# Patient Record
Sex: FEMALE | Race: WHITE | NOT HISPANIC OR LATINO | Employment: FULL TIME | URBAN - METROPOLITAN AREA
[De-identification: names, ages, dates, MRNs, and addresses within clinical notes are randomized per-mention and may not be internally consistent; named-entity substitution may affect disease eponyms.]

---

## 2017-05-26 ENCOUNTER — APPOINTMENT (OUTPATIENT)
Dept: RADIOLOGY | Facility: MEDICAL CENTER | Age: 47
End: 2017-05-26
Attending: EMERGENCY MEDICINE
Payer: MEDICAID

## 2017-05-26 ENCOUNTER — HOSPITAL ENCOUNTER (EMERGENCY)
Facility: MEDICAL CENTER | Age: 47
End: 2017-05-26
Attending: EMERGENCY MEDICINE
Payer: MEDICAID

## 2017-05-26 VITALS
OXYGEN SATURATION: 99 % | DIASTOLIC BLOOD PRESSURE: 79 MMHG | TEMPERATURE: 98.2 F | HEIGHT: 66 IN | RESPIRATION RATE: 16 BRPM | SYSTOLIC BLOOD PRESSURE: 137 MMHG | HEART RATE: 76 BPM

## 2017-05-26 DIAGNOSIS — S83.92XA SPRAIN OF LEFT KNEE, UNSPECIFIED LIGAMENT, INITIAL ENCOUNTER: ICD-10-CM

## 2017-05-26 DIAGNOSIS — M25.462 KNEE EFFUSION, LEFT: ICD-10-CM

## 2017-05-26 PROCEDURE — 99284 EMERGENCY DEPT VISIT MOD MDM: CPT

## 2017-05-26 PROCEDURE — 73564 X-RAY EXAM KNEE 4 OR MORE: CPT | Mod: LT

## 2017-05-26 PROCEDURE — 93971 EXTREMITY STUDY: CPT | Mod: 26 | Performed by: SURGERY

## 2017-05-26 PROCEDURE — 93971 EXTREMITY STUDY: CPT

## 2017-05-26 RX ORDER — TRAMADOL HYDROCHLORIDE 50 MG/1
50-100 TABLET ORAL EVERY 6 HOURS PRN
Qty: 20 TAB | Refills: 0 | Status: SHIPPED | OUTPATIENT
Start: 2017-05-26 | End: 2019-04-27 | Stop reason: CLARIF

## 2017-05-26 ASSESSMENT — PAIN SCALES - GENERAL: PAINLEVEL_OUTOF10: 8

## 2017-05-26 NOTE — ED NOTES
"Chief Complaint   Patient presents with   • Knee Injury     pt reports slipping 2 weeks ago on the stairs, yesterday she became unable to ambulte on L leg. CMS+     Blood pressure 159/94, pulse 78, temperature 36.8 °C (98.2 °F), resp. rate 16, height 1.676 m (5' 6\"), SpO2 99 %.  Pt informed of wait times. Educated on triage process.  Asked to return to triage RN for any new or worsening of symptoms. Thanked for patience.        "

## 2017-05-26 NOTE — ED AVS SNAPSHOT
[employer] Access Code: [email or web address]  Expires: 6/25/2017  2:10 PM    Your email address is not on file at [email or web address].  Email Addresses are required for you to sign up for [employer], please contact [phone] to verify your personal information and to provide your email address prior to attempting to register for [employer].    Leela Carmona  [address]  [place] [address]    [employer]  A secure, online tool to manage your health information     [email or web address]’s [employer]® is a secure, online tool that connects you to your personalized health information from the privacy of your home -- day or night - making it very easy for you to manage your healthcare. Once the activation process is completed, you can even access your medical information using the [employer] griselda, which is available for free in the Apple Griselda store or Google Play store.     To learn more about [employer], visit [email or web address]    There are two levels of access available (as shown below):   My Chart Features  [facility] Primary Care Doctor [facility]  Specialists [facility]  Urgent  Care Non-[facility] Primary Care Doctor   Email your healthcare team securely and privately 24/7 X X X    Manage appointments: schedule your next appointment; view details of past/upcoming appointments X      Request prescription refills. X      View recent personal medical records, including lab and immunizations X X X X   View health record, including health history, allergies, medications X X X X   Read reports about your outpatient visits, procedures, consult and ER notes X X X X   See your discharge summary, which is a recap of your hospital and/or ER visit that includes your diagnosis, lab results, and care plan X X  X     How to register for [email or web address]:  Once your e-mail address has been verified, follow the following steps to sign up for [email or web address].     1. Go to  [email or web address]  2. Click on the Sign Up Now box, which takes you to the New Member Sign Up  page. You will need to provide the following information:  a. Enter your [employer] Access Code exactly as it appears at the top of this page. (You will not need to use this code after you’ve completed the sign-up process. If you do not sign up before the expiration date, you must request a new code.)   b. Enter your date of birth.   c. Enter your home email address.   d. Click Submit, and follow the next screen’s instructions.  3. Create a [employer] Lily ID. This will be your [employer] login ID and cannot be changed, so think of one that is secure and easy to remember.  4. Create a [employer] password. You can change your password at any time.  5. Enter your Password Reset Question and Answer. This can be used at a later time if you forget your password.   6. Enter your e-mail address. This allows you to receive e-mail notifications when new information is available in [employer] Lily.  7. Click Sign Up. You can now view your health information.    For assistance activating your [employer] account, call [phone]

## 2017-05-26 NOTE — ED AVS SNAPSHOT
Home Care Instructions                                                                                                                Leela Carmona   MRN: [id number]    Department:  [facility], Emergency Dept   Date of Visit:  5/26/2017            [facility], Emergency Dept    [address]    [facility] [address]    Phone:  [phone]      You were seen by     Sandeep Zarate M.D.      Your Diagnosis Was     Sprain of left knee, unspecified ligament, initial encounter     S83.92XA       Follow-up Information     1. Schedule an appointment as soon as possible for a visit with Braxton Myrick M.D..    Specialty:  Orthopaedics    Contact information    [address]  [address]  [facility] [address]  [phone]        Medication Information     Review all of your home medications and newly ordered medications with your primary doctor and/or pharmacist as soon as possible. Follow medication instructions as directed by your doctor and/or pharmacist.     Please keep your complete medication list with you and share with your physician. Update the information when medications are discontinued, doses are changed, or new medications (including over-the-counter products) are added; and carry medication information at all times in the event of emergency situations.               Medication List      START taking these medications        Instructions    Morning Afternoon Evening Bedtime    tramadol 50 MG Tabs   Commonly known as:  ULTRAM        Take 1-2 Tabs by mouth every 6 hours as needed for Moderate Pain.   Dose:   mg                          ASK your doctor about these medications        Instructions    Morning Afternoon Evening Bedtime    citalopram 40 MG Tabs   Commonly known as:  CELEXA        Take 40 mg by mouth every day.   Dose:  40 mg                        SYNTHROID 175 MCG Tabs   Generic drug:  levothyroxine        Take 175 mcg by mouth Every morning on an  empty stomach.   Dose:  175 mcg                             Where to Get Your Medications      You can get these medications from any pharmacy     Bring a paper prescription for each of these medications    - tramadol 50 MG Tabs            Procedures and tests performed during your visit     APPLICATION KNEE IMMOBILIZER    CRUTCHES    DX-KNEE COMPLETE 4+ LEFT    LE VENOUS DUPLEX (Specify in Comments Left, Right Or Bilateral)        Discharge Instructions       Knee Effusion  Knee effusion means that you have extra fluid in your knee. This can cause pain. Your knee may be more difficult to bend and move.  HOME CARE  · Use crutches as told by your doctor.  · Wear a knee brace as told by your doctor.  · Apply ice to the swollen area:  · Put ice in a plastic bag.  · Place a towel between your skin and the bag.  · Leave the ice on for 20 minutes, 2-3 times per day.  · Keep your knee raised (elevated) when you are sitting or lying down.  · Take medicines only as told by your doctor.  · Do any rehabilitation or strengthening exercises as told by your doctor.  · Rest your knee as told by your doctor. You may start doing your normal activities again when your doctor says it is okay.  · Keep all follow-up visits as told by your doctor. This is important.  GET HELP IF:   · You continue to have pain in your knee.  GET HELP RIGHT AWAY IF:  · You have increased swelling or redness of your knee.  · You have severe pain in your knee.  · You have a fever.     This information is not intended to replace advice given to you by your health care provider. Make sure you discuss any questions you have with your health care provider.     Document Released: 01/20/2012 Document Revised: 01/08/2016 Document Reviewed: 08/03/2015  [employer] Interactive Patient Education ©2016 [employer].    Joint Sprain  A sprain is a tear or stretch in the ligaments that hold a joint together. Severe sprains may need as long as 3-6 weeks of immobilization  and/or exercises to heal completely. Sprained joints should be rested and protected. If not, they can become unstable and prone to re-injury. Proper treatment can reduce your pain, shorten the period of disability, and reduce the risk of repeated injuries.  TREATMENT   · Rest and elevate the injured joint to reduce pain and swelling.  · Apply ice packs to the injury for 20-30 minutes every 2-3 hours for the next 2-3 days.  · Keep the injury wrapped in a compression bandage or splint as long as the joint is painful or as instructed by your caregiver.  · Do not use the injured joint until it is completely healed to prevent re-injury and chronic instability. Follow the instructions of your caregiver.  · Long-term sprain management may require exercises and/or treatment by a physical therapist. Taping or special braces may help stabilize the joint until it is completely better.  SEEK MEDICAL CARE IF:   · You develop increased pain or swelling of the joint.  · You develop increasing redness and warmth of the joint.  · You develop a fever.  · It becomes stiff.  · Your hand or foot gets cold or numb.  Document Released: 01/25/2006 Document Revised: 03/11/2013 Document Reviewed: 01/04/2010  ExitCare® Patient Information ©2014 [employer].            Patient Information     Patient Information    Following emergency treatment: all patient requiring follow-up care must return either to a private physician or a clinic if your condition worsens before you are able to obtain further medical attention, please return to the emergency room.     Billing Information    At [facility], we work to make the billing process streamlined for our patients.  Our Representatives are here to answer any questions you may have regarding your hospital bill.  If you have insurance coverage and have supplied your insurance information to us, we will submit a claim to your insurer on your behalf.  Should you have any questions regarding your  bill, we can be reached online or by phone as follows:  Online: You are able pay your bills online or live chat with our representatives about any billing questions you may have. We are here to help Monday - Friday from 8:00am to 7:30pm and 9:00am - 12:00pm on Saturdays.  Please visit [email or web address]  for more information.   Phone:  [phone] or [phone]    Please note that your emergency physician, surgeon, pathologist, radiologist, anesthesiologist, and other specialists are not employed by [facility] and will therefore bill separately for their services.  Please contact them directly for any questions concerning their bills at the numbers below:     Emergency Physician Services:  [phone]  [facility]:  [phone]  [facility]:  [phone]  [facility]:  [phone]    1. Your final bill may vary from the amount quoted upon discharge if all procedures are not complete at that time, or if your doctor has additional procedures of which we are not aware. You will receive an additional bill if you return to the Emergency Department at [facility] for suture removal regardless of the facility of which the sutures were placed.     2. Please arrange for settlement of this account at the emergency registration.    3. All self-pay accounts are due in full at the time of treatment.  If you are unable to meet this obligation then payment is expected within 4-5 days.     4. If you have had radiology studies (CT, X-ray, Ultrasound, MRI), you have received a preliminary result during your emergency department visit. Please contact the radiology department [phone] to receive a copy of your final result. Please discuss the Final result with your primary physician or with the follow up physician provided.     Crisis Hotline:  [place] Crisis Hotline:  9-572-DUAINCD or 1-398.854.6873  [place] Crisis Hotline:     5-857-087-6309 or 666-212-5904         ED Discharge Follow Up Questions    1. In order to provide you with very good care, we would like to follow up with a phone call in the next few days.  May we have your permission to contact you?     YES /  NO    2. What is the best phone number to call you? (       )_____-__________    3. What is the best time to call you?      Morning  /  Afternoon  /  Evening                   Patient Signature:  ____________________________________________________________    Date:  ____________________________________________________________

## 2017-05-26 NOTE — ED PROVIDER NOTES
ED Provider Note    CHIEF COMPLAINT   Chief Complaint   Patient presents with   • Knee Injury     pt reports slipping 2 weeks ago on the stairs, yesterday she became unable to ambulte on L leg. CMS+       HPI   Leela Carmona is a 46 y.o. female who presents complaining of left knee pain radiating down to her left ankle.  She has noticed no swelling.  She denies history of DVT.  No chest pain, cough or shortness of breath.  Patient twisted her ankle 2 weeks ago however states for approximately 1 week things were well.  She then developed gradual pain left knee worsening over the last week.  She states today she cannot put any weight on her left leg secondary to pain in her knee.  Her male  states the patient has not been resting her leg adequately, in his opinion.  There was no direct blow to the knee.  The initial injury was a twist with pain.    REVIEW OF SYSTEMS   Musculoskeletal: Left knee pain, left leg pain  Neurologic: No headache  Skin: No swelling or bruising  Respiratory: No cough or pleurisy      PAST MEDICAL HISTORY   Past Medical History   Diagnosis Date   • Thyroid disorder    • Psychiatric disorder        FAMILY HISTORY  No family history on file.    SOCIAL HISTORY  Social History     Social History   • Marital Status: Single     Spouse Name: N/A   • Number of Children: N/A   • Years of Education: N/A     Social History Main Topics   • Smoking status: Current Every Day Smoker -- 0.50 packs/day for 33 years     Types: Cigarettes   • Smokeless tobacco: Never Used   • Alcohol Use: No   • Drug Use: Yes   • Sexual Activity:     Partners: Male     Other Topics Concern   • None     Social History Narrative       SURGICAL HISTORY  Past Surgical History   Procedure Laterality Date   • Primary c section  4/4/2012     Performed by JAROCHO DOYLE at LABOR AND DELIVERY       CURRENT MEDICATIONS   Home Medications     **Home medications have not yet been reviewed for this encounter**     "      ALLERGIES   No Known Allergies    PHYSICAL EXAM  VITAL SIGNS: /94 mmHg  Pulse 78  Temp(Src) 36.8 °C (98.2 °F)  Resp 16  Ht 1.676 m (5' 6\")  SpO2 99%  Skin: No swelling, no asymmetric edema..   Vascular: Intact distal capillary refill.  Normal left foot dorsal pedis pulse  Musculoskeletal: Diffuse left knee pain, mild left calf tenderness.  Negative Homans sign.  No deformity.  Left ankle and left hip are nontender  Neurologic: Sensation is intact in the right foot    RADIOLOGY/PROCEDURES  LE VENOUS DUPLEX (Specify in Comments Left, Right Or Bilateral)   Final Result      DX-KNEE COMPLETE 4+ LEFT   Final Result      Lateral subluxation of the patella with small joint effusion.               INTERPRETING LOCATION:  [address] [place] [address], [facility]            COURSE & MEDICAL DECISION MAKING  Pertinent Labs & Imaging studies reviewed. (See chart for details)  Patient with late onset knee pain week after twisting it, suspect sprain or possible meniscal injury.  There was an effusion on her x-ray and she will be referred to orthopedics.  Also secondary to late onset pain following the injury, patient's scan for DVT which was negative.  She is placed in knee immobilizer, crutches, discharged in stable condition.    FINAL IMPRESSION     1. Sprain of left knee, unspecified ligament, initial encounter    2. Knee effusion, left              Electronically signed by: Sandeep Zarate, 5/26/2017 12:25 PM    "

## 2017-05-26 NOTE — ED NOTES
Pt discharged to home. Pt was given follow up instructions and prescription for ultram. Pt verbalized understanding of all instructions for discharge and is ambulatory out of ED with steady gait.

## 2017-05-26 NOTE — DISCHARGE INSTRUCTIONS
Knee Effusion  Knee effusion means that you have extra fluid in your knee. This can cause pain. Your knee may be more difficult to bend and move.  HOME CARE  · Use crutches as told by your doctor.  · Wear a knee brace as told by your doctor.  · Apply ice to the swollen area:  · Put ice in a plastic bag.  · Place a towel between your skin and the bag.  · Leave the ice on for 20 minutes, 2-3 times per day.  · Keep your knee raised (elevated) when you are sitting or lying down.  · Take medicines only as told by your doctor.  · Do any rehabilitation or strengthening exercises as told by your doctor.  · Rest your knee as told by your doctor. You may start doing your normal activities again when your doctor says it is okay.  · Keep all follow-up visits as told by your doctor. This is important.  GET HELP IF:   · You continue to have pain in your knee.  GET HELP RIGHT AWAY IF:  · You have increased swelling or redness of your knee.  · You have severe pain in your knee.  · You have a fever.     This information is not intended to replace advice given to you by your health care provider. Make sure you discuss any questions you have with your health care provider.     Document Released: 01/20/2012 Document Revised: 01/08/2016 Document Reviewed: 08/03/2015  SeeMore Interactive Interactive Patient Education ©2016 SeeMore [employer] Inc.    Joint Sprain  A sprain is a tear or stretch in the ligaments that hold a joint together. Severe sprains may need as long as 3-6 weeks of immobilization and/or exercises to heal completely. Sprained joints should be rested and protected. If not, they can become unstable and prone to re-injury. Proper treatment can reduce your pain, shorten the period of disability, and reduce the risk of repeated injuries.  TREATMENT   · Rest and elevate the injured joint to reduce pain and swelling.  · Apply ice packs to the injury for 20-30 minutes every 2-3 hours for the next 2-3 days.  · Keep the injury wrapped in a  compression bandage or splint as long as the joint is painful or as instructed by your caregiver.  · Do not use the injured joint until it is completely healed to prevent re-injury and chronic instability. Follow the instructions of your caregiver.  · Long-term sprain management may require exercises and/or treatment by a physical therapist. Taping or special braces may help stabilize the joint until it is completely better.  SEEK MEDICAL CARE IF:   · You develop increased pain or swelling of the joint.  · You develop increasing redness and warmth of the joint.  · You develop a fever.  · It becomes stiff.  · Your hand or foot gets cold or numb.  Document Released: 01/25/2006 Document Revised: 03/11/2013 Document Reviewed: 01/04/2010  NanoSight® Patient Information ©2014 [employer], [employer]per[employer].

## 2017-05-26 NOTE — ED AVS SNAPSHOT
5/26/2017    Leela Carmona  [address]  [place] [address]    Dear Leela:    [facility] wants to ensure your discharge home is safe and you or your loved ones have had all of your questions answered regarding your care after you leave the hospital.    Below is a list of resources and contact information should you have any questions regarding your hospital stay, follow-up instructions, or active medical symptoms.    Questions or Concerns Regarding… Contact   Medical Questions Related to Your Discharge  (7 days a week, 8am-5pm) Contact a Nurse Care Coordinator   [phone]   Medical Questions Not Related to Your Discharge  (24 hours a day / 7 days a week)  Contact the Nurse Health Line   [phone]    Medications or Discharge Instructions Refer to your discharge packet   or contact your [facility] Primary Care Provider   [phone]   Follow-up Appointment(s) Schedule your appointment via [employer]   or contact Scheduling [phone]   Billing Review your statement via [employer]  or contact Billing [phone]   Medical Records Review your records via [employer]   or contact Medical Records [phone]     You may receive a telephone call within two days of discharge. This call is to make certain you understand your discharge instructions and have the opportunity to have any questions answered. You can also easily access your medical information, test results and upcoming appointments via the [employer] free online health management tool. You can learn more and sign up at [employer]. For assistance setting up your [employer] account, please call [phone].    Once again, we want to ensure your discharge home is safe and that you have a clear understanding of any next steps in your care. If you have any questions or concerns, please do not hesitate to contact us, we are here for you. Thank you for choosing [facility] for your healthcare needs.    Sincerely,    Your [facility] Healthcare Team

## 2017-07-14 ENCOUNTER — HOSPITAL ENCOUNTER (OUTPATIENT)
Dept: LAB | Facility: MEDICAL CENTER | Age: 47
End: 2017-07-14
Attending: FAMILY MEDICINE
Payer: COMMERCIAL

## 2017-07-14 LAB
ALBUMIN SERPL BCP-MCNC: 3.7 G/DL (ref 3.2–4.9)
ALBUMIN/GLOB SERPL: 1.4 G/DL
ALP SERPL-CCNC: 58 U/L (ref 30–99)
ALT SERPL-CCNC: 22 U/L (ref 2–50)
ANION GAP SERPL CALC-SCNC: 5 MMOL/L (ref 0–11.9)
APPEARANCE UR: CLEAR
AST SERPL-CCNC: 22 U/L (ref 12–45)
BACTERIA #/AREA URNS HPF: NEGATIVE /HPF
BASOPHILS # BLD AUTO: 1 % (ref 0–1.8)
BASOPHILS # BLD: 0.07 K/UL (ref 0–0.12)
BILIRUB SERPL-MCNC: 0.5 MG/DL (ref 0.1–1.5)
BILIRUB UR QL STRIP.AUTO: NEGATIVE
BUN SERPL-MCNC: 16 MG/DL (ref 8–22)
CALCIUM SERPL-MCNC: 9 MG/DL (ref 8.5–10.5)
CHLORIDE SERPL-SCNC: 108 MMOL/L (ref 96–112)
CHOLEST SERPL-MCNC: 179 MG/DL (ref 100–199)
CO2 SERPL-SCNC: 23 MMOL/L (ref 20–33)
COLOR UR: YELLOW
CREAT SERPL-MCNC: 0.78 MG/DL (ref 0.5–1.4)
EOSINOPHIL # BLD AUTO: 0.15 K/UL (ref 0–0.51)
EOSINOPHIL NFR BLD: 2 % (ref 0–6.9)
EPI CELLS #/AREA URNS HPF: ABNORMAL /HPF
ERYTHROCYTE [DISTWIDTH] IN BLOOD BY AUTOMATED COUNT: 43.8 FL (ref 35.9–50)
EST. AVERAGE GLUCOSE BLD GHB EST-MCNC: 94 MG/DL
GFR SERPL CREATININE-BSD FRML MDRD: >60 ML/MIN/1.73 M 2
GLOBULIN SER CALC-MCNC: 2.6 G/DL (ref 1.9–3.5)
GLUCOSE SERPL-MCNC: 68 MG/DL (ref 65–99)
GLUCOSE UR STRIP.AUTO-MCNC: NEGATIVE MG/DL
HBA1C MFR BLD: 4.9 % (ref 0–5.6)
HCT VFR BLD AUTO: 40.7 % (ref 37–47)
HDLC SERPL-MCNC: 48 MG/DL
HGB BLD-MCNC: 13.6 G/DL (ref 12–16)
HYALINE CASTS #/AREA URNS LPF: ABNORMAL /LPF
IMM GRANULOCYTES # BLD AUTO: 0.02 K/UL (ref 0–0.11)
IMM GRANULOCYTES NFR BLD AUTO: 0.3 % (ref 0–0.9)
KETONES UR STRIP.AUTO-MCNC: NEGATIVE MG/DL
LDLC SERPL CALC-MCNC: 110 MG/DL
LEUKOCYTE ESTERASE UR QL STRIP.AUTO: NEGATIVE
LYMPHOCYTES # BLD AUTO: 1.71 K/UL (ref 1–4.8)
LYMPHOCYTES NFR BLD: 23.2 % (ref 22–41)
MCH RBC QN AUTO: 30.2 PG (ref 27–33)
MCHC RBC AUTO-ENTMCNC: 33.4 G/DL (ref 33.6–35)
MCV RBC AUTO: 90.4 FL (ref 81.4–97.8)
MICRO URNS: ABNORMAL
MONOCYTES # BLD AUTO: 0.52 K/UL (ref 0–0.85)
MONOCYTES NFR BLD AUTO: 7.1 % (ref 0–13.4)
NEUTROPHILS # BLD AUTO: 4.89 K/UL (ref 2–7.15)
NEUTROPHILS NFR BLD: 66.4 % (ref 44–72)
NITRITE UR QL STRIP.AUTO: NEGATIVE
NRBC # BLD AUTO: 0 K/UL
NRBC BLD AUTO-RTO: 0 /100 WBC
PH UR STRIP.AUTO: 7 [PH]
PLATELET # BLD AUTO: 315 K/UL (ref 164–446)
PMV BLD AUTO: 9.3 FL (ref 9–12.9)
POTASSIUM SERPL-SCNC: 3.9 MMOL/L (ref 3.6–5.5)
PROT SERPL-MCNC: 6.3 G/DL (ref 6–8.2)
PROT UR QL STRIP: NEGATIVE MG/DL
RBC # BLD AUTO: 4.5 M/UL (ref 4.2–5.4)
RBC # URNS HPF: ABNORMAL /HPF
RBC UR QL AUTO: ABNORMAL
SODIUM SERPL-SCNC: 136 MMOL/L (ref 135–145)
SP GR UR STRIP.AUTO: 1.02
TRIGL SERPL-MCNC: 107 MG/DL (ref 0–149)
TSH SERPL DL<=0.005 MIU/L-ACNC: 11.24 UIU/ML (ref 0.3–3.7)
UROBILINOGEN UR STRIP.AUTO-MCNC: 0.2 MG/DL
WBC # BLD AUTO: 7.4 K/UL (ref 4.8–10.8)
WBC #/AREA URNS HPF: ABNORMAL /HPF

## 2017-07-14 PROCEDURE — 80061 LIPID PANEL: CPT

## 2017-07-14 PROCEDURE — 85025 COMPLETE CBC W/AUTO DIFF WBC: CPT

## 2017-07-14 PROCEDURE — 80053 COMPREHEN METABOLIC PANEL: CPT

## 2017-07-14 PROCEDURE — 36415 COLL VENOUS BLD VENIPUNCTURE: CPT

## 2017-07-14 PROCEDURE — 81001 URINALYSIS AUTO W/SCOPE: CPT

## 2017-07-14 PROCEDURE — 83036 HEMOGLOBIN GLYCOSYLATED A1C: CPT

## 2017-07-14 PROCEDURE — 84443 ASSAY THYROID STIM HORMONE: CPT

## 2017-08-03 ENCOUNTER — APPOINTMENT (OUTPATIENT)
Dept: RADIOLOGY | Facility: MEDICAL CENTER | Age: 47
End: 2017-08-03
Attending: EMERGENCY MEDICINE
Payer: COMMERCIAL

## 2017-08-03 ENCOUNTER — HOSPITAL ENCOUNTER (EMERGENCY)
Facility: MEDICAL CENTER | Age: 47
End: 2017-08-04
Attending: EMERGENCY MEDICINE
Payer: COMMERCIAL

## 2017-08-03 DIAGNOSIS — R31.9 HEMATURIA: ICD-10-CM

## 2017-08-03 DIAGNOSIS — M54.32 SCIATICA OF LEFT SIDE: ICD-10-CM

## 2017-08-03 LAB
APPEARANCE UR: CLEAR
BACTERIA #/AREA URNS HPF: NEGATIVE /HPF
BILIRUB UR QL STRIP.AUTO: NEGATIVE
COLOR UR: YELLOW
CULTURE IF INDICATED INDCX: NO UA CULTURE
EPI CELLS #/AREA URNS HPF: NEGATIVE /HPF
GLUCOSE UR STRIP.AUTO-MCNC: NEGATIVE MG/DL
HYALINE CASTS #/AREA URNS LPF: ABNORMAL /LPF
KETONES UR STRIP.AUTO-MCNC: ABNORMAL MG/DL
LEUKOCYTE ESTERASE UR QL STRIP.AUTO: NEGATIVE
MICRO URNS: ABNORMAL
NITRITE UR QL STRIP.AUTO: NEGATIVE
PH UR STRIP.AUTO: 5 [PH]
PROT UR QL STRIP: NEGATIVE MG/DL
RBC # URNS HPF: ABNORMAL /HPF
RBC UR QL AUTO: ABNORMAL
SP GR UR STRIP.AUTO: 1.04
UROBILINOGEN UR STRIP.AUTO-MCNC: 1 MG/DL
WBC #/AREA URNS HPF: ABNORMAL /HPF

## 2017-08-03 PROCEDURE — 700102 HCHG RX REV CODE 250 W/ 637 OVERRIDE(OP): Performed by: EMERGENCY MEDICINE

## 2017-08-03 PROCEDURE — 99284 EMERGENCY DEPT VISIT MOD MDM: CPT

## 2017-08-03 PROCEDURE — 72100 X-RAY EXAM L-S SPINE 2/3 VWS: CPT

## 2017-08-03 PROCEDURE — 81001 URINALYSIS AUTO W/SCOPE: CPT

## 2017-08-03 PROCEDURE — 700111 HCHG RX REV CODE 636 W/ 250 OVERRIDE (IP): Performed by: EMERGENCY MEDICINE

## 2017-08-03 PROCEDURE — A9270 NON-COVERED ITEM OR SERVICE: HCPCS | Performed by: EMERGENCY MEDICINE

## 2017-08-03 RX ORDER — ACETAMINOPHEN 325 MG/1
650 TABLET ORAL ONCE
Status: COMPLETED | OUTPATIENT
Start: 2017-08-03 | End: 2017-08-03

## 2017-08-03 RX ORDER — PREDNISONE 20 MG/1
60 TABLET ORAL DAILY
Status: DISCONTINUED | OUTPATIENT
Start: 2017-08-04 | End: 2017-08-03

## 2017-08-03 RX ORDER — PREDNISONE 20 MG/1
60 TABLET ORAL ONCE
Status: COMPLETED | OUTPATIENT
Start: 2017-08-03 | End: 2017-08-03

## 2017-08-03 RX ADMIN — PREDNISONE 60 MG: 20 TABLET ORAL at 21:25

## 2017-08-03 RX ADMIN — ACETAMINOPHEN 650 MG: 325 TABLET, FILM COATED ORAL at 21:25

## 2017-08-03 ASSESSMENT — PAIN SCALES - GENERAL: PAINLEVEL_OUTOF10: 7

## 2017-08-03 NOTE — ED AVS SNAPSHOT
Home Care Instructions                                                                                                                Leela Carmona   MRN: [id number]    Department:  [facility], Emergency Dept   Date of Visit:  8/3/2017            [facility], Emergency Dept    [address] [address]    Phone:  [phone]      You were seen by     Cal Solis M.D.      Your Diagnosis Was     Sciatica of left side     M54.32       These are the medications you received during your hospitalization from 08/03/2017 2008 to 08/04/2017 0026     Date/Time Order Dose Route Action    08/03/2017 2125 acetaminophen (TYLENOL) tablet 650 mg 650 mg Oral Given    08/03/2017 2125 predniSONE (DELTASONE) tablet 60 mg 60 mg Oral Given      Follow-up Information     1. Follow up with Sandeep Espinosa M.D..    Specialty:  Family Medicine    Contact information    [address]  [address]  [place] [address] [phone]          2. Follow up with Tu Saucedo M.D..    Specialty:  Orthopaedics    Contact information    [address]  [address]  [place] [address] [phone]        Medication Information     Review all of your home medications and newly ordered medications with your primary doctor and/or pharmacist as soon as possible. Follow medication instructions as directed by your doctor and/or pharmacist.     Please keep your complete medication list with you and share with your physician. Update the information when medications are discontinued, doses are changed, or new medications (including over-the-counter products) are added; and carry medication information at all times in the event of emergency situations.               Medication List      START taking these medications        Instructions    Morning Afternoon Evening Bedtime    predniSONE 20 MG Tabs   Last time this was given:  60 mg on 8/3/2017  9:25 PM   Commonly known as:  DELTASONE        Take 2  Tabs by mouth every day for 5 days.   Dose:  40 mg                          ASK your doctor about these medications        Instructions    Morning Afternoon Evening Bedtime    citalopram 40 MG Tabs   Commonly known as:  CELEXA        Take 40 mg by mouth every day.   Dose:  40 mg                        SYNTHROID 175 MCG Tabs   Generic drug:  levothyroxine        Take 175 mcg by mouth Every morning on an empty stomach.   Dose:  175 mcg                        tramadol 50 MG Tabs   Commonly known as:  ULTRAM        Take 1-2 Tabs by mouth every 6 hours as needed for Moderate Pain.   Dose:   mg                             Where to Get Your Medications      You can get these medications from any pharmacy     Bring a paper prescription for each of these medications    - predniSONE 20 MG Tabs            Procedures and tests performed during your visit     DX-LUMBAR SPINE-2 OR 3 VIEWS    URINALYSIS CULTURE, IF INDICATED    URINE MICROSCOPIC (W/UA)        Discharge Instructions       Sciatica  Sciatica is pain, weakness, numbness, or tingling along your sciatic nerve. The nerve starts in the lower back and runs down the back of each leg. Nerve damage or certain conditions pinch or put pressure on the sciatic nerve. This causes the pain, weakness, and other discomforts of sciatica.  HOME CARE   · Only take medicine as told by your doctor.  · Apply ice to the affected area for 20 minutes. Do this 3-4 times a day for the first 48-72 hours. Then try heat in the same way.  · Exercise, stretch, or do your usual activities if these do not make your pain worse.  · Go to physical therapy as told by your doctor.  · Keep all doctor visits as told.  · Do not wear high heels or shoes that are not supportive.  · Get a firm mattress if your mattress is too soft to lessen pain and discomfort.  GET HELP RIGHT AWAY IF:   · You cannot control when you poop (bowel movement) or pee (urinate).  · You have more weakness in your lower back,  lower belly (pelvis), butt (buttocks), or legs.  · You have redness or puffiness (swelling) of your back.  · You have a burning feeling when you pee.  · You have pain that gets worse when you lie down.  · You have pain that wakes you from your sleep.  · Your pain is worse than past pain.  · Your pain lasts longer than 4 weeks.  · You are suddenly losing weight without reason.  MAKE SURE YOU:   · Understand these instructions.  · Will watch this condition.  · Will get help right away if you are not doing well or get worse.     This information is not intended to replace advice given to you by your health care provider. Make sure you discuss any questions you have with your health care provider.     Document Released: 09/26/2009 Document Revised: 06/18/2013 Document Reviewed: 04/28/2013  [employer] Interactive Patient Education ©2016 [employer]    Radicular Pain  Radicular pain in either the arm or leg is usually from a bulging or herniated disk in the spine. A piece of the herniated disk may press against the nerves as the nerves exit the spine. This causes pain which is felt at the tips of the nerves down the arm or leg. Other causes of radicular pain may include:  · Fractures.  · Heart disease.  · Cancer.  · An abnormal and usually degenerative state of the nervous system or nerves (neuropathy).  Diagnosis may require CT or MRI scanning to determine the primary cause.   Nerves that start at the neck (nerve roots) may cause radicular pain in the outer shoulder and arm. It can spread down to the thumb and fingers. The symptoms vary depending on which nerve root has been affected. In most cases radicular pain improves with conservative treatment. Neck problems may require physical therapy, a neck collar, or cervical traction. Treatment may take many weeks, and surgery may be considered if the symptoms do not improve.   Conservative treatment is also recommended for sciatica. Sciatica causes pain to radiate from the  lower back or buttock area down the leg into the foot. Often there is a history of back problems. Most patients with sciatica are better after 2 to 4 weeks of rest and other supportive care. Short term bed rest can reduce the disk pressure considerably. Sitting, however, is not a good position since this increases the pressure on the disk. You should avoid bending, lifting, and all other activities which make the problem worse. Traction can be used in severe cases. Surgery is usually reserved for patients who do not improve within the first months of treatment.  Only take over-the-counter or prescription medicines for pain, discomfort, or fever as directed by your caregiver. Narcotics and muscle relaxants may help by relieving more severe pain and spasm and by providing mild sedation. Cold or massage can give significant relief. Spinal manipulation is not recommended. It can increase the degree of disc protrusion. Epidural steroid injections are often effective treatment for radicular pain. These injections deliver medicine to the spinal nerve in the space between the protective covering of the spinal cord and back bones (vertebrae). Your caregiver can give you more information about steroid injections. These injections are most effective when given within two weeks of the onset of pain.   You should see your caregiver for follow up care as recommended. A program for neck and back injury rehabilitation with stretching and strengthening exercises is an important part of management.   SEEK IMMEDIATE MEDICAL CARE IF:  · You develop increased pain, weakness, or numbness in your arm or leg.  · You develop difficulty with bladder or bowel control.  · You develop abdominal pain.     This information is not intended to replace advice given to you by your health care provider. Make sure you discuss any questions you have with your health care provider.     Document Released: 01/25/2006 Document Revised: 01/08/2016 Document  Reviewed: 04/12/2010  [employer] Interactive Patient Education ©2016 [employer]    Hematuria  Hematuria is the presence of blood in the urine. This condition can result from many problems. Some of these are:   · Urinary infections.   · Injuries.   · Kidney stones.   · Drug reactions.   · Tumors.   · Other diseases.   The treatment depends on the diagnosis. Further studies may be needed to find the cause even if the hematuria subsides on its own. An exam by an urologist may also be indicated.  If you are bleeding heavily, or have prostate problems, clots can form in the bladder and block the passage of urine. This requires emergency treatment with a catheter and bladder irrigation.   Contact your doctor for follow-up care within the next 2-4 days.  SEEK IMMEDIATE MEDICAL CARE IF:  You develop a fever, abdominal pain, urinary blockage, vomiting, or any other serious problems.  Document Released: 01/25/2006 Document Revised: 03/11/2013 Document Reviewed: 10/30/2009  ExitCare® Patient Information ©2013 E96.          Patient Information     Patient Information    Following emergency treatment: all patient requiring follow-up care must return either to a private physician or a clinic if your condition worsens before you are able to obtain further medical attention, please return to the emergency room.     Billing Information    At [facility], we work to make the billing process streamlined for our patients.  Our Representatives are here to answer any questions you may have regarding your hospital bill.  If you have insurance coverage and have supplied your insurance information to us, we will submit a claim to your insurer on your behalf.  Should you have any questions regarding your bill, we can be reached online or by phone as follows:  Online: You are able pay your bills online or live chat with our representatives about any billing questions you may have. We are here to help Monday - Friday from 8:00am  to 7:30pm and 9:00am - 12:00pm on Saturdays.  Please visit [email or web address]  for more information.   Phone:  [phone] or [phone]    Please note that your emergency physician, surgeon, pathologist, radiologist, anesthesiologist, and other specialists are not employed by [facility] and will therefore bill separately for their services.  Please contact them directly for any questions concerning their bills at the numbers below:     Emergency Physician Services:  [phone]  [facility]:  [phone]  [facility]:  [phone]  [facility]:  [phone]    1. Your final bill may vary from the amount quoted upon discharge if all procedures are not complete at that time, or if your doctor has additional procedures of which we are not aware. You will receive an additional bill if you return to the Emergency Department at [facility] for suture removal regardless of the facility of which the sutures were placed.     2. Please arrange for settlement of this account at the emergency registration.    3. All self-pay accounts are due in full at the time of treatment.  If you are unable to meet this obligation then payment is expected within 4-5 days.     4. If you have had radiology studies (CT, X-ray, Ultrasound, MRI), you have received a preliminary result during your emergency department visit. Please contact the radiology department [phone] to receive a copy of your final result. Please discuss the Final result with your primary physician or with the follow up physician provided.     Crisis Hotline:  [place] Crisis Hotline:  6-342-PZVSDQJ or 1-963.195.2678  [place] Crisis Hotline:    1-186.991.9839 or 136-887-3997         ED Discharge Follow Up Questions    1. In order to provide you with very good care, we would like to follow up with a phone call in the next few days.  May we have your permission to contact you?      YES /  NO    2. What is the best phone number to call you? (       )_____-__________    3. What is the best time to call you?      Morning  /  Afternoon  /  Evening                   Patient Signature:  ____________________________________________________________    Date:  ____________________________________________________________

## 2017-08-03 NOTE — ED AVS SNAPSHOT
Booyah Access Code: [email or web address]  Expires: 9/3/2017 12:26 AM    Your email address is not on file at [facility].  Email Addresses are required for you to sign up for Booyah, please contact [phone] to verify your personal information and to provide your email address prior to attempting to register for Booyah.    Leela Carmona  [address]  [place] [address]    Booyah  A secure, online tool to manage your health information     [facility]’s Booyah® is a secure, online tool that connects you to your personalized health information from the privacy of your home -- day or night - making it very easy for you to manage your healthcare. Once the activation process is completed, you can even access your medical information using the Booyah griselda, which is available for free in the Apple Griselda store or Google Play store.     To learn more about Booyah, visit [email or web address]    There are two levels of access available (as shown below):   My Chart Features  [facility] Primary Care Doctor [facility]  Specialists [facility]  Urgent  Care Non-[facility] Primary Care Doctor   Email your healthcare team securely and privately 24/7 X X X    Manage appointments: schedule your next appointment; view details of past/upcoming appointments X      Request prescription refills. X      View recent personal medical records, including lab and immunizations X X X X   View health record, including health history, allergies, medications X X X X   Read reports about your outpatient visits, procedures, consult and ER notes X X X X   See your discharge summary, which is a recap of your hospital and/or ER visit that includes your diagnosis, lab results, and care plan X X  X     How to register for Booyah:  Once your e-mail address has been verified, follow the following steps to sign up for Booyah.     1. Go to  [email or web address]  2. Click on the Sign Up Now box, which takes you to the New Member Sign Up page.  You will need to provide the following information:  a. Enter your Ripar[employer] Access Code exactly as it appears at the top of this page. (You will not need to use this code after you’ve completed the sign-up process. If you do not sign up before the expiration date, you must request a new code.)   b. Enter your date of birth.   c. Enter your home email address.   d. Click Submit, and follow the next screen’s instructions.  3. Create a haystaggt ID. This will be your RiparAutOnline login ID and cannot be changed, so think of one that is secure and easy to remember.  4. Create a RiparAutOnline password. You can change your password at any time.  5. Enter your Password Reset Question and Answer. This can be used at a later time if you forget your password.   6. Enter your e-mail address. This allows you to receive e-mail notifications when new information is available in RiparAutOnline.  7. Click Sign Up. You can now view your health information.    For assistance activating your RiparAutOnline account, call [phone]

## 2017-08-03 NOTE — ED AVS SNAPSHOT
8/4/2017    Leela Carmona  [address]  [facility] [address]    Dear Leela:    [facility] wants to ensure your discharge home is safe and you or your loved ones have had all of your questions answered regarding your care after you leave the hospital.    Below is a list of resources and contact information should you have any questions regarding your hospital stay, follow-up instructions, or active medical symptoms.    Questions or Concerns Regarding… Contact   Medical Questions Related to Your Discharge  (7 days a week, 8am-5pm) Contact a Nurse Care Coordinator   [phone]   Medical Questions Not Related to Your Discharge  (24 hours a day / 7 days a week)  Contact the Nurse Health Line   [phone]    Medications or Discharge Instructions Refer to your discharge packet   or contact your [facility] Primary Care Provider   [phone]   Follow-up Appointment(s) Schedule your appointment via [employer]   or contact Scheduling [phone]   Billing Review your statement via [employer]  or contact Billing [phone]   Medical Records Review your records via [employer]   or contact Medical Records [phone]     You may receive a telephone call within two days of discharge. This call is to make certain you understand your discharge instructions and have the opportunity to have any questions answered. You can also easily access your medical information, test results and upcoming appointments via the [employer] free online health management tool. You can learn more and sign up at [email or web address]. For assistance setting up your [employer] account, please call [phone].    Once again, we want to ensure your discharge home is safe and that you have a clear understanding of any next steps in your care. If you have any questions or concerns, please do not hesitate to contact us, we are here for you. Thank you for choosing [facility] for your healthcare needs.    Sincerely,    Your [facility] Healthcare Team

## 2017-08-04 VITALS
BODY MASS INDEX: 37.31 KG/M2 | SYSTOLIC BLOOD PRESSURE: 145 MMHG | DIASTOLIC BLOOD PRESSURE: 74 MMHG | HEIGHT: 66 IN | WEIGHT: 232.14 LBS | RESPIRATION RATE: 19 BRPM | TEMPERATURE: 98.8 F | OXYGEN SATURATION: 98 % | HEART RATE: 76 BPM

## 2017-08-04 RX ORDER — PREDNISONE 20 MG/1
40 TABLET ORAL DAILY
Qty: 10 TAB | Refills: 0 | Status: SHIPPED | OUTPATIENT
Start: 2017-08-04 | End: 2017-08-09

## 2017-08-04 NOTE — DISCHARGE INSTRUCTIONS
Sciatica  Sciatica is pain, weakness, numbness, or tingling along your sciatic nerve. The nerve starts in the lower back and runs down the back of each leg. Nerve damage or certain conditions pinch or put pressure on the sciatic nerve. This causes the pain, weakness, and other discomforts of sciatica.  HOME CARE   · Only take medicine as told by your doctor.  · Apply ice to the affected area for 20 minutes. Do this 3-4 times a day for the first 48-72 hours. Then try heat in the same way.  · Exercise, stretch, or do your usual activities if these do not make your pain worse.  · Go to physical therapy as told by your doctor.  · Keep all doctor visits as told.  · Do not wear high heels or shoes that are not supportive.  · Get a firm mattress if your mattress is too soft to lessen pain and discomfort.  GET HELP RIGHT AWAY IF:   · You cannot control when you poop (bowel movement) or pee (urinate).  · You have more weakness in your lower back, lower belly (pelvis), butt (buttocks), or legs.  · You have redness or puffiness (swelling) of your back.  · You have a burning feeling when you pee.  · You have pain that gets worse when you lie down.  · You have pain that wakes you from your sleep.  · Your pain is worse than past pain.  · Your pain lasts longer than 4 weeks.  · You are suddenly losing weight without reason.  MAKE SURE YOU:   · Understand these instructions.  · Will watch this condition.  · Will get help right away if you are not doing well or get worse.     This information is not intended to replace advice given to you by your health care provider. Make sure you discuss any questions you have with your health care provider.     Document Released: 09/26/2009 Document Revised: 06/18/2013 Document Reviewed: 04/28/2013  [employer] Interactive Patient Education ©2016 [employer] Inc.    Radicular Pain  Radicular pain in either the arm or leg is usually from a bulging or herniated disk in the spine. A piece of the herniated  disk may press against the nerves as the nerves exit the spine. This causes pain which is felt at the tips of the nerves down the arm or leg. Other causes of radicular pain may include:  · Fractures.  · Heart disease.  · Cancer.  · An abnormal and usually degenerative state of the nervous system or nerves (neuropathy).  Diagnosis may require CT or MRI scanning to determine the primary cause.   Nerves that start at the neck (nerve roots) may cause radicular pain in the outer shoulder and arm. It can spread down to the thumb and fingers. The symptoms vary depending on which nerve root has been affected. In most cases radicular pain improves with conservative treatment. Neck problems may require physical therapy, a neck collar, or cervical traction. Treatment may take many weeks, and surgery may be considered if the symptoms do not improve.   Conservative treatment is also recommended for sciatica. Sciatica causes pain to radiate from the lower back or buttock area down the leg into the foot. Often there is a history of back problems. Most patients with sciatica are better after 2 to 4 weeks of rest and other supportive care. Short term bed rest can reduce the disk pressure considerably. Sitting, however, is not a good position since this increases the pressure on the disk. You should avoid bending, lifting, and all other activities which make the problem worse. Traction can be used in severe cases. Surgery is usually reserved for patients who do not improve within the first months of treatment.  Only take over-the-counter or prescription medicines for pain, discomfort, or fever as directed by your caregiver. Narcotics and muscle relaxants may help by relieving more severe pain and spasm and by providing mild sedation. Cold or massage can give significant relief. Spinal manipulation is not recommended. It can increase the degree of disc protrusion. Epidural steroid injections are often effective treatment for radicular  pain. These injections deliver medicine to the spinal nerve in the space between the protective covering of the spinal cord and back bones (vertebrae). Your caregiver can give you more information about steroid injections. These injections are most effective when given within two weeks of the onset of pain.   You should see your caregiver for follow up care as recommended. A program for neck and back injury rehabilitation with stretching and strengthening exercises is an important part of management.   SEEK IMMEDIATE MEDICAL CARE IF:  · You develop increased pain, weakness, or numbness in your arm or leg.  · You develop difficulty with bladder or bowel control.  · You develop abdominal pain.     This information is not intended to replace advice given to you by your health care provider. Make sure you discuss any questions you have with your health care provider.     Document Released: 01/25/2006 Document Revised: 01/08/2016 Document Reviewed: 04/12/2010  [employer] Interactive Patient Education ©2016 [employer]    Hematuria  Hematuria is the presence of blood in the urine. This condition can result from many problems. Some of these are:   · Urinary infections.   · Injuries.   · Kidney stones.   · Drug reactions.   · Tumors.   · Other diseases.   The treatment depends on the diagnosis. Further studies may be needed to find the cause even if the hematuria subsides on its own. An exam by an urologist may also be indicated.  If you are bleeding heavily, or have prostate problems, clots can form in the bladder and block the passage of urine. This requires emergency treatment with a catheter and bladder irrigation.   Contact your doctor for follow-up care within the next 2-4 days.  SEEK IMMEDIATE MEDICAL CARE IF:  You develop a fever, abdominal pain, urinary blockage, vomiting, or any other serious problems.  Document Released: 01/25/2006 Document Revised: 03/11/2013 Document Reviewed: 10/30/2009  ExitCare® Patient  Information ©2013 [facility].

## 2017-08-04 NOTE — ED NOTES
Pt discharged home . Discharge instructions reviewed with patient, all questions answered.   Pt ambulatory, vital signs stable.   Pt states they have a safe way home.

## 2017-08-04 NOTE — ED PROVIDER NOTES
"ED Provider Note    Scribed for Cal Solis M.D. by Ursula Coello. 8/3/2017, 8:43 PM.    Primary care provider: Sandeep Espinosa M.D.  Means of arrival: Walk-in  History obtained from: Patient  History limited by: None    CHIEF COMPLAINT  Chief Complaint   Patient presents with   • Low Back Pain     L side x2 weeks       HPI  Leela Carmona is a 46 y.o. female who presents to the Emergency Department for left sided lower back pain onset two weeks ago. The patient states that the pain radiates down into her legs and can be intermittent. This pain initially began when the patient hurt her left knee by moving 3 months ago and was seen by an orthopedist. Now the pain has been up her back and hip on the left side, exacerbated at the end of the day after walking and working all day. She has been taking four 800mg Ibuprofen every six hours for two weeks now. The patient denies IV drug use, urinary or bowel incontinence, or unexpected weight gain or loss.     REVIEW OF SYSTEMS  See HPI for further details. All other systems are negative.   E    PAST MEDICAL HISTORY   has a past medical history of Thyroid disorder and Psychiatric disorder.    SURGICAL HISTORY   has past surgical history that includes primary c section (4/4/2012).    SOCIAL HISTORY  Social History   Substance Use Topics   • Smoking status: Current Every Day Smoker -- 0.50 packs/day for 33 years     Types: Cigarettes   • Smokeless tobacco: Never Used   • Alcohol Use: No      History   Drug Use   • Yes       FAMILY HISTORY  No pertinent family history     CURRENT MEDICATIONS  Reviewed.  See Encounter Summary.     ALLERGIES  No Known Allergies    PHYSICAL EXAM  VITAL SIGNS: /88 mmHg  Pulse 76  Temp(Src) 36.8 °C (98.2 °F)  Resp 18  Ht 1.676 m (5' 6\")  Wt 105.3 kg (232 lb 2.3 oz)  BMI 37.49 kg/m2  SpO2 100%  Constitutional: Alert in no apparent distress. Tearful.  HENT: No signs of trauma, Bilateral external ears normal, Nose normal.   Eyes: " Pupils are equal and reactive, Conjunctiva normal, Non-icteric.   Neck: Normal range of motion, No tenderness, Supple, No stridor.   Lymphatic: No lymphadenopathy noted.   Cardiovascular: Regular rate and rhythm, no murmurs.   Thorax & Lungs: Normal breath sounds, No respiratory distress, No wheezing, No chest tenderness.   Abdomen: Bowel sounds normal, Soft, No tenderness, No masses, No pulsatile masses. No peritoneal signs.  Skin: Warm, Dry, No erythema, No rash.   Back: No bony tenderness, No CVA tenderness.   Extremities: Intact distal pulses, No edema, No cyanosis, Left SI join tenderness, Negative straight leg raise  Musculoskeletal: Good range of motion in all major joints. No tenderness to palpation or major deformities noted.   Neurologic: Alert , Normal motor function, Normal sensory function, No focal deficits noted.   Psychiatric: Affect normal, Judgment normal, Mood normal.     DIAGNOSTIC STUDIES / PROCEDURES     LABS  Results for orders placed or performed during the hospital encounter of 08/03/17   URINALYSIS CULTURE, IF INDICATED   Result Value Ref Range    Color Yellow     Character Clear     Specific Gravity 1.036 <1.035    Ph 5.0 5.0-8.0    Glucose Negative Negative mg/dL    Ketones Trace (A) Negative mg/dL    Protein Negative Negative mg/dL    Bilirubin Negative Negative    Urobilinogen, Urine 1.0 Negative    Nitrite Negative Negative    Leukocyte Esterase Negative Negative    Occult Blood Trace (A) Negative    Micro Urine Req Microscopic     Culture Indicated No UA Culture   URINE MICROSCOPIC (W/UA)   Result Value Ref Range    WBC 0-2 /hpf    RBC 5-10 (A) /hpf    Bacteria Negative None /hpf    Epithelial Cells Negative /hpf    Hyaline Cast 0-2 /lpf     All labs were reviewed by me.    RADIOLOGY  DX-LUMBAR SPINE-2 OR 3 VIEWS   Final Result      Interspace arthropathy as noted above. No acute findings.        COURSE & MEDICAL DECISION MAKING  Pertinent Labs & Imaging studies reviewed. (See chart  for details)      8:46 PM - Patient seen and examined at bedside. I informed the patient of the likelihood of sciatica related pain. We also discussed the possibility of bladder or urinary infection. Patient will be treated with Tylenol 650mg PO, Prednisone 60mg PO. Ordered Urinalysis to evaluate her symptoms.     12:04 AM Recheck: Patient re-evaluated at beside. Patient reports feeling improved. Discussed patient's condition and treatment plan including potential further workup of patient's hematuria or discharge and follow up. I informed the patient of the possibility of proceeding with a CT but she stated that she would like to decline it at this time. Patient's lab and radiology results discussed. The patient understands and verbalizes agreement.     Decision Making:  This is a 46 y.o. year old female who presents with left buttock pain with radiation down leg. Strong suspicion for sciatica. X-rays unremarkable. Urinalysis does show evidence of hematuria but no evidence of UTI also decreasing ability for pyelonephritis. I do not believe that her hematuria increases my suspicion for kidney stone pathology given her presentation today. I will give her prescription for ongoing steroids and have her continue Tylenol and NSAIDs at home. I have referred her back to orthopedics for reevaluation and ongoing care. She is additionally says she can't talk her primary care physician about her symptoms as well.    The patient was informed of their blood pressure exceeding 120/80 and I have asked them to follow up with their primary care physician to discuss further monitoring and possible treatment.     The patient will return for new or worsening symptoms and is stable at the time of discharge.    The patient is referred to a primary physician for blood pressure management, diabetic screening, and for all other preventative health concerns.    DISPOSITION:  Patient will be discharged home in stable condition.    FOLLOW  UP:  Sandeep Espinosa M.D.  [address]  [address]  [place] [address]  [phone]          Tu Saucedo M.D.  [address]  [address]  [place] [address]  [phone]          OUTPATIENT MEDICATIONS:  New Prescriptions    PREDNISONE (DELTASONE) 20 MG TAB    Take 2 Tabs by mouth every day for 5 days.     FINAL IMPRESSION  1. Sciatica of left side    2. Hematuria          Ursula CASTILLO (Ezio), am scribing for, and in the presence of, Cal Solis M.D..    Electronically signed by: Ursula Coello (Ezio), 8/3/2017    Cal CASTILLO M.D. personally performed the services described in this documentation, as scribed by Ursula Coello in my presence, and it is both accurate and complete.    The note accurately reflects work and decisions made by me.  Cal Solis  8/4/2017  4:31 AM

## 2017-08-04 NOTE — ED NOTES
"Leela Corinna Carmona    Chief Complaint   Patient presents with   • Low Back Pain     L side x2 weeks       Pt ambulatory to triage with above complaint.  Pt states L lower back pain has increased today but has had these symptoms for 2 weeks.  Pain 7/10 described as sharp at times. Pt verbalized she saw PCP for low back pain and MD stated she had blood in her urine.  Pt was not given antibiotics after this result.  Pt denies obvious blood or pain with urination.  Pt given urine specimen cup.  VSS.  Pt returned to lobby, educated on triage process, and to inform staff of any changes or concerns.    Blood Pressure: (!) 178/88 mmHg, Pulse: 76, Respiration: 18, Temperature: 36.8 °C (98.2 °F), Height: 167.6 cm (5' 6\"), Weight: 105.3 kg (232 lb 2.3 oz), Pulse Oximetry: 100 %      "

## 2018-01-22 ENCOUNTER — HOSPITAL ENCOUNTER (OUTPATIENT)
Dept: RADIOLOGY | Facility: MEDICAL CENTER | Age: 48
End: 2018-01-22
Attending: FAMILY MEDICINE
Payer: COMMERCIAL

## 2018-01-22 DIAGNOSIS — R05.9 COUGH: ICD-10-CM

## 2018-01-22 PROCEDURE — 71046 X-RAY EXAM CHEST 2 VIEWS: CPT

## 2018-08-22 ENCOUNTER — HOSPITAL ENCOUNTER (OUTPATIENT)
Dept: LAB | Facility: MEDICAL CENTER | Age: 48
End: 2018-08-22
Attending: FAMILY MEDICINE
Payer: COMMERCIAL

## 2018-08-22 LAB
ALBUMIN SERPL BCP-MCNC: 3.6 G/DL (ref 3.2–4.9)
ALBUMIN/GLOB SERPL: 1.3 G/DL
ALP SERPL-CCNC: 74 U/L (ref 30–99)
ALT SERPL-CCNC: 19 U/L (ref 2–50)
ANION GAP SERPL CALC-SCNC: 7 MMOL/L (ref 0–11.9)
APPEARANCE UR: CLEAR
AST SERPL-CCNC: 20 U/L (ref 12–45)
BASOPHILS # BLD AUTO: 1 % (ref 0–1.8)
BASOPHILS # BLD: 0.08 K/UL (ref 0–0.12)
BILIRUB SERPL-MCNC: 0.5 MG/DL (ref 0.1–1.5)
BILIRUB UR QL STRIP.AUTO: NEGATIVE
BUN SERPL-MCNC: 12 MG/DL (ref 8–22)
CALCIUM SERPL-MCNC: 9 MG/DL (ref 8.5–10.5)
CHLORIDE SERPL-SCNC: 107 MMOL/L (ref 96–112)
CHOLEST SERPL-MCNC: 184 MG/DL (ref 100–199)
CO2 SERPL-SCNC: 23 MMOL/L (ref 20–33)
COLOR UR: YELLOW
CREAT SERPL-MCNC: 0.67 MG/DL (ref 0.5–1.4)
EOSINOPHIL # BLD AUTO: 0.18 K/UL (ref 0–0.51)
EOSINOPHIL NFR BLD: 2.3 % (ref 0–6.9)
ERYTHROCYTE [DISTWIDTH] IN BLOOD BY AUTOMATED COUNT: 42.6 FL (ref 35.9–50)
GLOBULIN SER CALC-MCNC: 2.7 G/DL (ref 1.9–3.5)
GLUCOSE SERPL-MCNC: 71 MG/DL (ref 65–99)
GLUCOSE UR STRIP.AUTO-MCNC: NEGATIVE MG/DL
HCT VFR BLD AUTO: 43.8 % (ref 37–47)
HDLC SERPL-MCNC: 43 MG/DL
HGB BLD-MCNC: 14.8 G/DL (ref 12–16)
IMM GRANULOCYTES # BLD AUTO: 0.02 K/UL (ref 0–0.11)
IMM GRANULOCYTES NFR BLD AUTO: 0.3 % (ref 0–0.9)
KETONES UR STRIP.AUTO-MCNC: NEGATIVE MG/DL
LDLC SERPL CALC-MCNC: 109 MG/DL
LEUKOCYTE ESTERASE UR QL STRIP.AUTO: NEGATIVE
LYMPHOCYTES # BLD AUTO: 1.62 K/UL (ref 1–4.8)
LYMPHOCYTES NFR BLD: 20.9 % (ref 22–41)
MCH RBC QN AUTO: 29.8 PG (ref 27–33)
MCHC RBC AUTO-ENTMCNC: 33.8 G/DL (ref 33.6–35)
MCV RBC AUTO: 88.1 FL (ref 81.4–97.8)
MICRO URNS: NORMAL
MONOCYTES # BLD AUTO: 0.59 K/UL (ref 0–0.85)
MONOCYTES NFR BLD AUTO: 7.6 % (ref 0–13.4)
NEUTROPHILS # BLD AUTO: 5.27 K/UL (ref 2–7.15)
NEUTROPHILS NFR BLD: 67.9 % (ref 44–72)
NITRITE UR QL STRIP.AUTO: NEGATIVE
NRBC # BLD AUTO: 0 K/UL
NRBC BLD-RTO: 0 /100 WBC
PH UR STRIP.AUTO: 7 [PH]
PLATELET # BLD AUTO: 303 K/UL (ref 164–446)
PMV BLD AUTO: 9.1 FL (ref 9–12.9)
POTASSIUM SERPL-SCNC: 4 MMOL/L (ref 3.6–5.5)
PROT SERPL-MCNC: 6.3 G/DL (ref 6–8.2)
PROT UR QL STRIP: NEGATIVE MG/DL
RBC # BLD AUTO: 4.97 M/UL (ref 4.2–5.4)
RBC UR QL AUTO: NEGATIVE
SODIUM SERPL-SCNC: 137 MMOL/L (ref 135–145)
SP GR UR STRIP.AUTO: 1.02
TRIGL SERPL-MCNC: 159 MG/DL (ref 0–149)
TSH SERPL DL<=0.005 MIU/L-ACNC: 0.27 UIU/ML (ref 0.38–5.33)
UROBILINOGEN UR STRIP.AUTO-MCNC: 0.2 MG/DL
WBC # BLD AUTO: 7.8 K/UL (ref 4.8–10.8)

## 2018-08-22 PROCEDURE — 80053 COMPREHEN METABOLIC PANEL: CPT

## 2018-08-22 PROCEDURE — 85025 COMPLETE CBC W/AUTO DIFF WBC: CPT

## 2018-08-22 PROCEDURE — 36415 COLL VENOUS BLD VENIPUNCTURE: CPT

## 2018-08-22 PROCEDURE — 80061 LIPID PANEL: CPT

## 2018-08-22 PROCEDURE — 81003 URINALYSIS AUTO W/O SCOPE: CPT

## 2018-08-22 PROCEDURE — 84443 ASSAY THYROID STIM HORMONE: CPT

## 2018-08-27 ENCOUNTER — NON-PROVIDER VISIT (OUTPATIENT)
Dept: URGENT CARE | Facility: PHYSICIAN GROUP | Age: 48
End: 2018-08-27

## 2018-08-27 DIAGNOSIS — Z02.1 PRE-EMPLOYMENT DRUG SCREENING: ICD-10-CM

## 2018-08-27 LAB
AMP AMPHETAMINE: NORMAL
BAR BARBITURATES: NORMAL
BZO BENZODIAZEPINES: NORMAL
COC COCAINE: NORMAL
INT CON NEG: NEGATIVE
INT CON POS: POSITIVE
MDMA ECSTASY: NORMAL
MET METHAMPHETAMINES: NORMAL
MTD METHADONE: NORMAL
OPI OPIATES: NORMAL
OXY OXYCODONE: NORMAL
PCP PHENCYCLIDINE: NORMAL
POC URINE DRUG SCREEN OCDRS: NORMAL
THC: NORMAL

## 2018-08-27 PROCEDURE — 80305 DRUG TEST PRSMV DIR OPT OBS: CPT | Performed by: PHYSICIAN ASSISTANT

## 2018-09-05 ENCOUNTER — NON-PROVIDER VISIT (OUTPATIENT)
Dept: OCCUPATIONAL MEDICINE | Facility: CLINIC | Age: 48
End: 2018-09-05

## 2018-09-05 PROCEDURE — 8911 PR MRO FEE: Performed by: INTERNAL MEDICINE

## 2019-04-27 ENCOUNTER — HOSPITAL ENCOUNTER (EMERGENCY)
Facility: MEDICAL CENTER | Age: 49
End: 2019-04-27
Attending: EMERGENCY MEDICINE
Payer: MEDICAID

## 2019-04-27 ENCOUNTER — APPOINTMENT (OUTPATIENT)
Dept: RADIOLOGY | Facility: MEDICAL CENTER | Age: 49
End: 2019-04-27
Attending: EMERGENCY MEDICINE
Payer: MEDICAID

## 2019-04-27 ENCOUNTER — HOSPITAL ENCOUNTER (INPATIENT)
Facility: MEDICAL CENTER | Age: 49
LOS: 1 days | DRG: 282 | End: 2019-04-29
Attending: EMERGENCY MEDICINE | Admitting: HOSPITALIST
Payer: MEDICAID

## 2019-04-27 VITALS
WEIGHT: 229.28 LBS | OXYGEN SATURATION: 95 % | BODY MASS INDEX: 36.85 KG/M2 | SYSTOLIC BLOOD PRESSURE: 120 MMHG | HEIGHT: 66 IN | RESPIRATION RATE: 19 BRPM | HEART RATE: 58 BPM | TEMPERATURE: 97.8 F | DIASTOLIC BLOOD PRESSURE: 75 MMHG

## 2019-04-27 DIAGNOSIS — R94.31 ABNORMAL EKG: ICD-10-CM

## 2019-04-27 DIAGNOSIS — R79.89 ELEVATED TROPONIN: ICD-10-CM

## 2019-04-27 DIAGNOSIS — I21.4 NSTEMI (NON-ST ELEVATED MYOCARDIAL INFARCTION) (HCC): ICD-10-CM

## 2019-04-27 DIAGNOSIS — I24.9 ACS (ACUTE CORONARY SYNDROME) (HCC): ICD-10-CM

## 2019-04-27 PROBLEM — I10 ESSENTIAL HYPERTENSION: Status: ACTIVE | Noted: 2019-04-27

## 2019-04-27 PROBLEM — E03.9 HYPOTHYROIDISM: Status: ACTIVE | Noted: 2019-04-27

## 2019-04-27 PROBLEM — Z72.0 TOBACCO USE: Status: ACTIVE | Noted: 2019-04-27

## 2019-04-27 LAB
ALBUMIN SERPL BCP-MCNC: 2.8 G/DL (ref 3.2–4.9)
ALBUMIN/GLOB SERPL: 1.3 G/DL
ALP SERPL-CCNC: 58 U/L (ref 30–99)
ALT SERPL-CCNC: 13 U/L (ref 2–50)
ANION GAP SERPL CALC-SCNC: 7 MMOL/L (ref 0–11.9)
APTT PPP: 193.1 SEC (ref 24.7–36)
APTT PPP: 47.8 SEC (ref 24.7–36)
AST SERPL-CCNC: 15 U/L (ref 12–45)
BASOPHILS # BLD AUTO: 0.8 % (ref 0–1.8)
BASOPHILS # BLD: 0.08 K/UL (ref 0–0.12)
BILIRUB SERPL-MCNC: 0.5 MG/DL (ref 0.1–1.5)
BUN SERPL-MCNC: 15 MG/DL (ref 8–22)
CALCIUM SERPL-MCNC: 8 MG/DL (ref 8.4–10.2)
CHLORIDE SERPL-SCNC: 106 MMOL/L (ref 96–112)
CO2 SERPL-SCNC: 22 MMOL/L (ref 20–33)
CREAT SERPL-MCNC: 0.87 MG/DL (ref 0.5–1.4)
EKG IMPRESSION: NORMAL
EKG IMPRESSION: NORMAL
EOSINOPHIL # BLD AUTO: 0.08 K/UL (ref 0–0.51)
EOSINOPHIL NFR BLD: 0.8 % (ref 0–6.9)
ERYTHROCYTE [DISTWIDTH] IN BLOOD BY AUTOMATED COUNT: 40 FL (ref 35.9–50)
GLOBULIN SER CALC-MCNC: 2.2 G/DL (ref 1.9–3.5)
GLUCOSE SERPL-MCNC: 89 MG/DL (ref 65–99)
HCT VFR BLD AUTO: 43.9 % (ref 37–47)
HGB BLD-MCNC: 15 G/DL (ref 12–16)
IMM GRANULOCYTES # BLD AUTO: 0.04 K/UL (ref 0–0.11)
IMM GRANULOCYTES NFR BLD AUTO: 0.4 % (ref 0–0.9)
INR PPP: 1.17 (ref 0.87–1.13)
LYMPHOCYTES # BLD AUTO: 1.79 K/UL (ref 1–4.8)
LYMPHOCYTES NFR BLD: 17.1 % (ref 22–41)
MCH RBC QN AUTO: 29.3 PG (ref 27–33)
MCHC RBC AUTO-ENTMCNC: 34.2 G/DL (ref 33.6–35)
MCV RBC AUTO: 85.7 FL (ref 81.4–97.8)
MONOCYTES # BLD AUTO: 0.7 K/UL (ref 0–0.85)
MONOCYTES NFR BLD AUTO: 6.7 % (ref 0–13.4)
NEUTROPHILS # BLD AUTO: 7.75 K/UL (ref 2–7.15)
NEUTROPHILS NFR BLD: 74.2 % (ref 44–72)
NRBC # BLD AUTO: 0 K/UL
NRBC BLD-RTO: 0 /100 WBC
PLATELET # BLD AUTO: 276 K/UL (ref 164–446)
PMV BLD AUTO: 8.5 FL (ref 9–12.9)
POTASSIUM SERPL-SCNC: 3.6 MMOL/L (ref 3.6–5.5)
PROT SERPL-MCNC: 5 G/DL (ref 6–8.2)
PROTHROMBIN TIME: 15 SEC (ref 12–14.6)
RBC # BLD AUTO: 5.12 M/UL (ref 4.2–5.4)
SODIUM SERPL-SCNC: 135 MMOL/L (ref 135–145)
TROPONIN I SERPL-MCNC: 0.16 NG/ML (ref 0–0.04)
TROPONIN I SERPL-MCNC: 2.28 NG/ML (ref 0–0.04)
TROPONIN I SERPL-MCNC: 3.54 NG/ML (ref 0–0.04)
WBC # BLD AUTO: 10.4 K/UL (ref 4.8–10.8)

## 2019-04-27 PROCEDURE — 99245 OFF/OP CONSLTJ NEW/EST HI 55: CPT | Performed by: INTERNAL MEDICINE

## 2019-04-27 PROCEDURE — 700111 HCHG RX REV CODE 636 W/ 250 OVERRIDE (IP)

## 2019-04-27 PROCEDURE — 99220 PR INITIAL OBSERVATION CARE,LEVL III: CPT | Performed by: INTERNAL MEDICINE

## 2019-04-27 PROCEDURE — 700111 HCHG RX REV CODE 636 W/ 250 OVERRIDE (IP): Performed by: INTERNAL MEDICINE

## 2019-04-27 PROCEDURE — 84484 ASSAY OF TROPONIN QUANT: CPT

## 2019-04-27 PROCEDURE — 96365 THER/PROPH/DIAG IV INF INIT: CPT

## 2019-04-27 PROCEDURE — 700102 HCHG RX REV CODE 250 W/ 637 OVERRIDE(OP): Performed by: EMERGENCY MEDICINE

## 2019-04-27 PROCEDURE — 85025 COMPLETE CBC W/AUTO DIFF WBC: CPT

## 2019-04-27 PROCEDURE — 71045 X-RAY EXAM CHEST 1 VIEW: CPT

## 2019-04-27 PROCEDURE — 85610 PROTHROMBIN TIME: CPT

## 2019-04-27 PROCEDURE — 99285 EMERGENCY DEPT VISIT HI MDM: CPT

## 2019-04-27 PROCEDURE — 93005 ELECTROCARDIOGRAM TRACING: CPT | Performed by: INTERNAL MEDICINE

## 2019-04-27 PROCEDURE — 700102 HCHG RX REV CODE 250 W/ 637 OVERRIDE(OP): Performed by: INTERNAL MEDICINE

## 2019-04-27 PROCEDURE — 96366 THER/PROPH/DIAG IV INF ADDON: CPT

## 2019-04-27 PROCEDURE — 36415 COLL VENOUS BLD VENIPUNCTURE: CPT

## 2019-04-27 PROCEDURE — A9270 NON-COVERED ITEM OR SERVICE: HCPCS | Performed by: EMERGENCY MEDICINE

## 2019-04-27 PROCEDURE — 84484 ASSAY OF TROPONIN QUANT: CPT | Mod: 91

## 2019-04-27 PROCEDURE — G0378 HOSPITAL OBSERVATION PER HR: HCPCS

## 2019-04-27 PROCEDURE — A9270 NON-COVERED ITEM OR SERVICE: HCPCS | Performed by: INTERNAL MEDICINE

## 2019-04-27 PROCEDURE — 93005 ELECTROCARDIOGRAM TRACING: CPT | Performed by: EMERGENCY MEDICINE

## 2019-04-27 PROCEDURE — 85730 THROMBOPLASTIN TIME PARTIAL: CPT | Mod: 91

## 2019-04-27 PROCEDURE — 93005 ELECTROCARDIOGRAM TRACING: CPT

## 2019-04-27 PROCEDURE — 80053 COMPREHEN METABOLIC PANEL: CPT

## 2019-04-27 PROCEDURE — 96374 THER/PROPH/DIAG INJ IV PUSH: CPT

## 2019-04-27 RX ORDER — NITROGLYCERIN 0.4 MG/1
0.4 TABLET SUBLINGUAL
Status: DISCONTINUED | OUTPATIENT
Start: 2019-04-27 | End: 2019-04-29 | Stop reason: HOSPADM

## 2019-04-27 RX ORDER — HEPARIN SODIUM 1000 [USP'U]/ML
6000 INJECTION, SOLUTION INTRAVENOUS; SUBCUTANEOUS ONCE
Status: COMPLETED | OUTPATIENT
Start: 2019-04-27 | End: 2019-04-27

## 2019-04-27 RX ORDER — ONDANSETRON 4 MG/1
4 TABLET, ORALLY DISINTEGRATING ORAL EVERY 4 HOURS PRN
Status: DISCONTINUED | OUTPATIENT
Start: 2019-04-27 | End: 2019-04-29 | Stop reason: HOSPADM

## 2019-04-27 RX ORDER — LISINOPRIL AND HYDROCHLOROTHIAZIDE 20; 12.5 MG/1; MG/1
1 TABLET ORAL DAILY
Status: ON HOLD | COMMUNITY
End: 2019-04-29

## 2019-04-27 RX ORDER — ASPIRIN 81 MG/1
324 TABLET, CHEWABLE ORAL ONCE
Status: COMPLETED | OUTPATIENT
Start: 2019-04-27 | End: 2019-04-27

## 2019-04-27 RX ORDER — PROMETHAZINE HYDROCHLORIDE 25 MG/1
12.5-25 TABLET ORAL EVERY 4 HOURS PRN
Status: DISCONTINUED | OUTPATIENT
Start: 2019-04-27 | End: 2019-04-29 | Stop reason: HOSPADM

## 2019-04-27 RX ORDER — HEPARIN SODIUM 1000 [USP'U]/ML
3200 INJECTION, SOLUTION INTRAVENOUS; SUBCUTANEOUS PRN
Status: DISCONTINUED | OUTPATIENT
Start: 2019-04-27 | End: 2019-04-29

## 2019-04-27 RX ORDER — CITALOPRAM 20 MG/1
40 TABLET ORAL EVERY EVENING
Status: DISCONTINUED | OUTPATIENT
Start: 2019-04-27 | End: 2019-04-29 | Stop reason: HOSPADM

## 2019-04-27 RX ORDER — GUAIFENESIN/DEXTROMETHORPHAN 100-10MG/5
10 SYRUP ORAL EVERY 6 HOURS PRN
Status: DISCONTINUED | OUTPATIENT
Start: 2019-04-27 | End: 2019-04-29 | Stop reason: HOSPADM

## 2019-04-27 RX ORDER — PROMETHAZINE HYDROCHLORIDE 25 MG/1
12.5-25 SUPPOSITORY RECTAL EVERY 4 HOURS PRN
Status: DISCONTINUED | OUTPATIENT
Start: 2019-04-27 | End: 2019-04-29 | Stop reason: HOSPADM

## 2019-04-27 RX ORDER — POLYETHYLENE GLYCOL 3350 17 G/17G
1 POWDER, FOR SOLUTION ORAL
Status: DISCONTINUED | OUTPATIENT
Start: 2019-04-27 | End: 2019-04-29 | Stop reason: HOSPADM

## 2019-04-27 RX ORDER — ACETAMINOPHEN 325 MG/1
650 TABLET ORAL EVERY 6 HOURS PRN
Status: DISCONTINUED | OUTPATIENT
Start: 2019-04-27 | End: 2019-04-29 | Stop reason: HOSPADM

## 2019-04-27 RX ORDER — MORPHINE SULFATE 4 MG/ML
2-4 INJECTION, SOLUTION INTRAMUSCULAR; INTRAVENOUS
Status: DISCONTINUED | OUTPATIENT
Start: 2019-04-27 | End: 2019-04-29 | Stop reason: HOSPADM

## 2019-04-27 RX ORDER — SODIUM CHLORIDE, SODIUM LACTATE, POTASSIUM CHLORIDE, CALCIUM CHLORIDE 600; 310; 30; 20 MG/100ML; MG/100ML; MG/100ML; MG/100ML
INJECTION, SOLUTION INTRAVENOUS CONTINUOUS
Status: DISCONTINUED | OUTPATIENT
Start: 2019-04-27 | End: 2019-04-27

## 2019-04-27 RX ORDER — BISACODYL 10 MG
10 SUPPOSITORY, RECTAL RECTAL
Status: DISCONTINUED | OUTPATIENT
Start: 2019-04-27 | End: 2019-04-29 | Stop reason: HOSPADM

## 2019-04-27 RX ORDER — SODIUM CHLORIDE 9 MG/ML
INJECTION, SOLUTION INTRAVENOUS CONTINUOUS
Status: DISCONTINUED | OUTPATIENT
Start: 2019-04-27 | End: 2019-04-27

## 2019-04-27 RX ORDER — METOPROLOL TARTRATE 1 MG/ML
5 INJECTION, SOLUTION INTRAVENOUS EVERY 4 HOURS PRN
Status: DISCONTINUED | OUTPATIENT
Start: 2019-04-27 | End: 2019-04-29 | Stop reason: HOSPADM

## 2019-04-27 RX ORDER — ATORVASTATIN CALCIUM 80 MG/1
80 TABLET, FILM COATED ORAL EVERY EVENING
Status: DISCONTINUED | OUTPATIENT
Start: 2019-04-27 | End: 2019-04-29 | Stop reason: HOSPADM

## 2019-04-27 RX ORDER — HEPARIN SODIUM 1000 [USP'U]/ML
INJECTION, SOLUTION INTRAVENOUS; SUBCUTANEOUS
Status: COMPLETED
Start: 2019-04-27 | End: 2019-04-27

## 2019-04-27 RX ORDER — ASPIRIN 325 MG
325 TABLET ORAL ONCE
Status: DISCONTINUED | OUTPATIENT
Start: 2019-04-27 | End: 2019-04-27

## 2019-04-27 RX ORDER — ONDANSETRON 2 MG/ML
4 INJECTION INTRAMUSCULAR; INTRAVENOUS EVERY 4 HOURS PRN
Status: DISCONTINUED | OUTPATIENT
Start: 2019-04-27 | End: 2019-04-29 | Stop reason: HOSPADM

## 2019-04-27 RX ORDER — AMOXICILLIN 250 MG
2 CAPSULE ORAL 2 TIMES DAILY
Status: DISCONTINUED | OUTPATIENT
Start: 2019-04-27 | End: 2019-04-29 | Stop reason: HOSPADM

## 2019-04-27 RX ORDER — NICOTINE 21 MG/24HR
14 PATCH, TRANSDERMAL 24 HOURS TRANSDERMAL
Status: DISCONTINUED | OUTPATIENT
Start: 2019-04-27 | End: 2019-04-29 | Stop reason: HOSPADM

## 2019-04-27 RX ADMIN — HEPARIN SODIUM 1200 UNITS/HR: 5000 INJECTION, SOLUTION INTRAVENOUS at 16:35

## 2019-04-27 RX ADMIN — LEVOTHYROXINE SODIUM 175 MCG: 125 TABLET ORAL at 16:41

## 2019-04-27 RX ADMIN — ASPIRIN 81 MG 324 MG: 81 TABLET ORAL at 13:27

## 2019-04-27 RX ADMIN — NICOTINE 14 MG: 14 PATCH, EXTENDED RELEASE TRANSDERMAL at 16:32

## 2019-04-27 RX ADMIN — HEPARIN SODIUM 6000 UNITS: 1000 INJECTION, SOLUTION INTRAVENOUS; SUBCUTANEOUS at 13:32

## 2019-04-27 RX ADMIN — ATORVASTATIN CALCIUM 80 MG: 80 TABLET, FILM COATED ORAL at 16:32

## 2019-04-27 RX ADMIN — CITALOPRAM HYDROBROMIDE 40 MG: 20 TABLET ORAL at 16:32

## 2019-04-27 ASSESSMENT — COGNITIVE AND FUNCTIONAL STATUS - GENERAL
SUGGESTED CMS G CODE MODIFIER DAILY ACTIVITY: CH
MOBILITY SCORE: 24
DAILY ACTIVITIY SCORE: 24
SUGGESTED CMS G CODE MODIFIER MOBILITY: CH

## 2019-04-27 ASSESSMENT — PATIENT HEALTH QUESTIONNAIRE - PHQ9
1. LITTLE INTEREST OR PLEASURE IN DOING THINGS: NOT AT ALL
2. FEELING DOWN, DEPRESSED, IRRITABLE, OR HOPELESS: NOT AT ALL
SUM OF ALL RESPONSES TO PHQ9 QUESTIONS 1 AND 2: 0

## 2019-04-27 ASSESSMENT — ENCOUNTER SYMPTOMS
DIZZINESS: 0
HEADACHES: 0
PALPITATIONS: 0
NAUSEA: 0
BLURRED VISION: 0
VOMITING: 0
ABDOMINAL PAIN: 0
BACK PAIN: 0
FEVER: 0
DIAPHORESIS: 0
SHORTNESS OF BREATH: 0
COUGH: 0

## 2019-04-27 ASSESSMENT — PAIN DESCRIPTION - DESCRIPTORS
DESCRIPTORS: ACHING
DESCRIPTORS: ACHING

## 2019-04-27 ASSESSMENT — LIFESTYLE VARIABLES: EVER_SMOKED: YES

## 2019-04-27 NOTE — ED NOTES
Bedside report given to RNKimberly. Kimberly was updated on critical aPTT lab value. Pt is currently asymptomatic. Pt transported via gurney on cardiac monitor.

## 2019-04-27 NOTE — ED NOTES
Charge mirella bautista aware of pt departure from [facility] as well as report given to same

## 2019-04-27 NOTE — ED TRIAGE NOTES
Chief Complaint   Patient presents with   • Chest Pain     aching chest pain onset of 1115 while at work, + troponin at [facility]     Pt BIB EMS as a transfer from [facility] for above complaints. The pt was treated at [facility] with 324mg of ASA and 6,000 units of Heparin. On arrival at [facility] the pt was able to ambulate to the bathroom without distress, skin PWD, and no chest pain. The pt is currently chest pain free, EKG performed, and is on the monitor. The ERP is at the bedside.

## 2019-04-27 NOTE — ED PROVIDER NOTES
ED Provider Note    Scribed for Gil Joseph M.D. by Aracelis Mcnamara. 4/27/2019, 2:18 PM.    Primary care provider: Sandeep Espinosa M.D.  Means of arrival: Ambulance  History obtained from: Patient  History limited by: None     CHIEF COMPLAINT  Chief Complaint   Patient presents with   • Chest Pain     aching chest pain onset of 1115 while at work, + troponin at [facility]  Leela Carmona is a 48 y.o. female with a history of thyroid disorder who presents to the Emergency Department as a transfer from [facility]. She initially presented to the ER with aching left-sided chest pain onset 11:15 AM while at work. Patient reports the episode lasted around 15 minutes and states the pain has resolved. She reports she experienced associated sweats and shortness of breath. She denies pain exacerbation with exertion. Denies nausea. She is a smoker and smokes 1 pack of cigarettes per day. Patient has an extensive family history of coronary disease and MI in multiple family members. She has not undergone a stress test. Patient was transferred to this ED for NSTEMI with elevated Troponin of 0.16. She was given aspirin and IV heparin at the transferring facility.     REVIEW OF SYSTEMS  Pertinent positives include chest pain, diaphoresis, shortness of breath.   Pertinent negatives include no nausea.   As above, all other systems reviewed and are negative.   See HPI for further details.     PAST MEDICAL HISTORY   has a past medical history of Psychiatric disorder and Thyroid disorder.    SURGICAL HISTORY   has a past surgical history that includes primary c section (4/4/2012).    SOCIAL HISTORY  Social History   Substance Use Topics   • Smoking status: Current Every Day Smoker     Packs/day: 0.50     Years: 33.00     Types: Cigarettes   • Smokeless tobacco: Never Used   • Alcohol use No      History   Drug Use     FAMILY HISTORY  None noted    CURRENT MEDICATIONS  No current facility-administered  "medications on file prior to encounter.      Current Outpatient Prescriptions on File Prior to Encounter   Medication Sig Dispense Refill   • lisinopril-hydrochlorothiazide (PRINZIDE, ZESTORETIC) 20-12.5 MG per tablet Take 1 Tab by mouth every day.     • levothyroxine (SYNTHROID) 175 MCG Tab Take 175 mcg by mouth every evening.     • citalopram (CELEXA) 40 MG TABS Take 40 mg by mouth every day.       ALLERGIES  No Known Allergies    PHYSICAL EXAM  VITAL SIGNS: Pulse 62   Ht 1.676 m (5' 6\")   Wt 103.9 kg (229 lb)   LMP 04/26/2019 (Exact Date)   SpO2 100%   BMI 36.96 kg/m²   Vitals reviewed.  Constitutional: Alert in no apparent distress.  HENT: No signs of trauma, Bilateral external ears normal, Nose normal.   Eyes: Pupils are equal and reactive, Conjunctiva normal, Non-icteric.   Neck: Normal range of motion, No tenderness, Supple, No stridor.   Lymphatic: No lymphadenopathy noted.   Cardiovascular: Regular rate and rhythm, no murmurs.   Thorax & Lungs: Normal breath sounds, No respiratory distress, No wheezing, No chest tenderness.   Abdomen: Bowel sounds normal, Soft, No tenderness, No peritoneal signs, No masses, No pulsatile masses.   Skin: Warm, Dry, No erythema, No rash.   Back: No bony tenderness, No CVA tenderness.   Extremities: Intact distal pulses, No edema, No tenderness, No cyanosis  Musculoskeletal: Good range of motion in all major joints. No tenderness to palpation or major deformities noted.   Neurologic: Alert , Normal motor function, Normal sensory function, No focal deficits noted.   Psychiatric: Affect normal, Judgment normal, Mood normal.     DIAGNOSTIC STUDIES / PROCEDURES    LABS  Labs Reviewed   PROTHROMBIN TIME   APTT      All labs reviewed by me.    EKG Interpretation:  Interpreted by me    12 Lead EKG interpreted by me to show:  Normal sinus rhythm  Rate 61  Axis: Normal  Intervals: Normal  Normal ST segments  Poor R wave progression anteriorly   Compared to EKG from earlier today at " 11:42 AM, no significant changes.   My impression of this EKG: Does not meet STEMI criteria at this time.    RADIOLOGY  No orders to display     The radiologist's interpretation of all radiological studies have been reviewed by me.    COURSE & MEDICAL DECISION MAKING  Nursing notes, VS, PMSFHx reviewed in chart.  Differential diagnoses include but not limited to: NSTEMI      Obtained and reviewed medical records from transferring facility showed the following:    Troponin 0.16  CBC Normal   CMP Normal     She was given aspirin and bolus of IV heparin at [facility].     2:18 PM Patient seen and examined at bedside. Ordered for EKG to evaluate. EKG shows poor R wave progression indicating possible anterior infarct of unknown age. No ST elevation, the patient is pain free currently. She was informed of plan of care including admission for further cardiac work up and cardiology consult. Discussed with patient that cardiology consult is necessary in order to determine whether patient may need a catheterization. Patient understands and is agreeable.     I have ordered PT/PTT for patient given heparin bolus.     2:25 PM Paged Dr. Negrete, Cardiology.     2:33 PM Consulted Dr. Negrete, Cardiology, who requests for coagulation work up and agrees with admission. He will consult patient. Ordered PT/INR and PTT.     2:35 PM Consulted Dr. Herrera, Hospitalist, who agreed to admit patient.     DISPOSITION:  Patient will be admitted to Dr. Herrera, Hospitalist, in guarded condition.    FINAL IMPRESSION  1. NSTEMI (non-ST elevated myocardial infarction) (HCC)    2. Elevated troponin    3. Abnormal EKG       Aracelis CASTILLO), am scribing for, and in the presence of, Gil Joseph M.D..  Electronically signed by: Aracelis Wing), 4/27/2019  Gil CASTILLO M.D. personally performed the services described in this documentation, as scribed by Aracelis Mcnamara in my presence, and it is both accurate and complete.    The note accurately  reflects work and decisions made by me.  Gil Joseph  4/27/2019  2:42 PM

## 2019-04-27 NOTE — ED NOTES
"Pt presents with a medical history significant for thyroid disorder, psychiatric disorder, and HTN.  She C/O acute onset of central chest pain with associated dyspnea at rest persisting for the past 30 minutes.   Chief Complaint   Patient presents with   • Chest Pain   • Shortness of Breath     /75   Pulse 78   Temp 36.6 °C (97.8 °F) (Temporal)   Resp 18   Ht 1.676 m (5' 6\")   Wt 104 kg (229 lb 4.5 oz)   LMP 04/26/2019 (Exact Date)   SpO2 98%   BMI 37.01 kg/m²     "

## 2019-04-27 NOTE — CONSULTS
Cardiology Initial Consult Note    Date of note:    4/27/2019      Consulting Physician: Gil Joseph M.D.    Patient ID:    Name:   Leela Carmona   YOB: 1970  Age:   48 y.o.  female   MRN:   [id number]      Reason for Consultation: NSTEMI    HPI:  Leela Carmona is a 48 y.o.-year-old female with a history of hypertension, obesity, current smoking who presented with chest pain.    She reports her usual state of health is completely asymptomatic and able to walk 45 minutes on a treadmill.  She has lost 30 pounds in the last couple months due to dietary changes and exercise, however she does continue to smoke around 1 pack per day.  She had an episode of non-exertional indigestion 3 weeks ago without recurrence and has exercised since.  Today when she had been at work for 10 minutes, she had acute onset of 4-5/10 substernal chest pressure associated with with SOB and diaphoresis which resolved around 30 minutes spontaneously.  She presented to [facility] ER for evaluation and was found to have a troponin of 0.16.  She was then transferred to [facility] after my discussion with Dr. Truong for further cardiac evaluation.      She is currently chest pain free. She was given aspirin 325mg PO and heparin 6000 units IV prior to arrival.           ROS  Constitution: Negative for chills, fever and night sweats.   HENT: Negative for nosebleeds.    Eyes: Negative for vision loss in left eye and vision loss in right eye.   Respiratory: Negative for hemoptysis.    Gastrointestinal: Negative for hematemesis, hematochezia and melena.   Genitourinary: Negative for hematuria.   Neurological: Negative for focal weakness, numbness and paresthesias.     + dry cough since starting lisinopril.      All others reviewed and negative.      Past Medical History:   Diagnosis Date   • Hypertension, essential    • NSTEMI (non-ST elevated myocardial infarction) (HCC) 04/27/2019   • Pre-diabetes    • Psychiatric  "disorder    • Smoking    • Thyroid disorder        Past Surgical History:   Procedure Laterality Date   • PRIMARY C SECTION  4/4/2012    Performed by JAROCHO DOYLE at LABOR AND DELIVERY       Current Outpatient Prescriptions   Medication Sig Dispense Refill   • lisinopril-hydrochlorothiazide (PRINZIDE, ZESTORETIC) 20-12.5 MG per tablet Take 1 Tab by mouth every day.     • levothyroxine (SYNTHROID) 175 MCG Tab Take 175 mcg by mouth every evening.     • citalopram (CELEXA) 40 MG TABS Take 40 mg by mouth every day.           No Known Allergies      Family History   Problem Relation Age of Onset   • Heart Attack Maternal Grandfather    • Other Mother         cardiac arrest, ? PEA         Social History     Social History   • Marital status: Single     Spouse name: N/A   • Number of children: N/A   • Years of education: N/A     Occupational History   • Not on file.     Social History Main Topics   • Smoking status: Current Every Day Smoker     Packs/day: 0.75     Years: 33.00     Types: Cigarettes   • Smokeless tobacco: Never Used   • Alcohol use Yes      Comment: rare   • Drug use: No   • Sexual activity: Yes     Partners: Male     Other Topics Concern   • Not on file     Social History Narrative    Works as  at [employer].          Physical Exam  Body mass index is 36.96 kg/m².  /68   Pulse 61   Resp 16   Ht 1.676 m (5' 6\")   Wt 103.9 kg (229 lb)   SpO2 98%   Vitals:    04/27/19 1415 04/27/19 1418 04/27/19 1511 04/27/19 1530   BP:  104/68     Pulse:  64 65 61   Resp:  16     SpO2:  99% 100% 98%   Weight: 103.9 kg (229 lb)      Height: 1.676 m (5' 6\")        Oxygen Therapy:  Pulse Oximetry: 98 %, O2 Delivery: None (Room Air)    General: Well appearing and in no apparent distress  Eyes: nl conjunctiva  ENT: OP clear  Neck: JVP 4 cm H2O, no carotid bruits  Lungs: normal respiratory effort, CTAB  Heart: RRR, no murmurs, no rubs or gallops, no edema bilateral lower extremities. No LV/RV heave on " cardiac palpatation. 2+ bilateral radial pulses.  2+ bilateral dp pulses.   Abdomen: soft, non tender, non distended, no masses, normal bowel sounds.  No HSM.  Extremities/MSK: no clubbing, no cyanosis  Neurological: No focal sensory deficits  Psychiatric: Appropriate affect, A/O x 3  Skin: Warm extremities        Labs (personally reviewed and notable for):   Trop 0.16      Cardiac Imaging and Procedures Review:    EKG dated 4/17/2019: My personal interpretation is NSR, low voltage, poor r wave progression.     Radiology test Review:  CXR: 4/27/2019  The mediastinal and cardiac silhouette is unremarkable.    The pulmonary vascularity is within normal limits.    The lung parenchyma is clear.    There is no significant pleural effusion.    There is no visible pneumothorax.    There are no acute bony abnormalities.           Impression and Medical Decision Making:  # NSTEMI  # Hypertension  # Obesity  # Dyslipidemia  # Current smoking      Recommendations:  # continue aspirin 81mg PO Daily  # heparin gtt  # discussed and consented for cardiac catheterization in the AM  # lipitor 80mg PO Daily  # ntg prn  # switch lisinopril to losartan prior to DC, restart at losartan 25mg PO daily if BP >140.  # can start metoprolol tartrate 25mg PO bid if symptoms recur overnight and she requires ntg  # trend trop  # echo  # admit to tele      Thank you for allowing me to participate in the care of this patient, Dr. Evans will continue to follow starting tomorrow.  Please contact me with any questions.      Prasanth Negrete MD  Cardiologist, [facility] [place]   [phone]

## 2019-04-27 NOTE — H&P
Hospital Medicine History & Physical Note    Date of Service  4/27/2019    Primary Care Physician  Sandeep Espinosa M.D.    Consultants  Cardiology    Code Status  Full    Chief Complaint  Chest pain    History of Presenting Illness  48 y.o. female who presented 4/27/2019 with tobacco use, hypertension who presents with acute onset of chest pressure without radiation.  Associated with shortness of breath and diaphoresis.  Lasted about half an hour.  She also notes past 2 weeks of indigestion, epigastric discomfort.  She did not have chest pain with previous activity.  She is an active smoker and had a maternal grandfather with multiple cardiac events at a young age.  At [facility] ED, troponin was positive at 0.16, EKG showed Q waves in V1-V3.  Cardiology was consulted and recommended transfer.  She was given aspirin and heparin bolus prior to transfer.  Currently she says her chest pain has resolved.    Review of Systems  Review of Systems   Constitutional: Negative for diaphoresis and fever.   HENT: Negative for congestion.    Eyes: Negative for blurred vision.   Respiratory: Negative for cough and shortness of breath.    Cardiovascular: Negative for chest pain and palpitations.   Gastrointestinal: Negative for abdominal pain, nausea and vomiting.   Musculoskeletal: Negative for back pain.   Neurological: Negative for dizziness and headaches.       Past Medical History   has a past medical history of Hypertension, essential; NSTEMI (non-ST elevated myocardial infarction) (HCC) (04/27/2019); Pre-diabetes; Psychiatric disorder; Smoking; and Thyroid disorder.    Surgical History   has a past surgical history that includes primary c section (4/4/2012).     Family History  family history includes Heart Attack in her maternal grandfather; Other in her mother.     Social History   reports that she has been smoking Cigarettes.  She has a 24.75 pack-year smoking history. She has never used smokeless tobacco. She reports  that she drinks alcohol. She reports that she does not use drugs.    Allergies  No Known Allergies    Medications  Prior to Admission Medications   Prescriptions Last Dose Informant Patient Reported? Taking?   citalopram (CELEXA) 40 MG TABS  Patient Yes No   Sig: Take 40 mg by mouth every day.   levothyroxine (SYNTHROID) 175 MCG Tab  Patient Yes No   Sig: Take 175 mcg by mouth every evening.   lisinopril-hydrochlorothiazide (PRINZIDE, ZESTORETIC) 20-12.5 MG per tablet  Patient Yes No   Sig: Take 1 Tab by mouth every day.      Facility-Administered Medications: None       Physical Exam  Temp:  [36.4 °C (97.6 °F)] 36.4 °C (97.6 °F)  Pulse:  [51-67] 67  Resp:  [14-16] 14  BP: (101-104)/(64-68) 101/64  SpO2:  [91 %-100 %] 91 %    Physical Exam   Constitutional: She is oriented to person, place, and time. She appears well-developed and well-nourished.   HENT:   Head: Normocephalic.   Mouth/Throat: Oropharynx is clear and moist.   Eyes: Conjunctivae are normal. Right eye exhibits no discharge. Left eye exhibits no discharge.   Cardiovascular: Normal rate and regular rhythm.    Pulmonary/Chest: Effort normal. She has no wheezes. She has no rales.   Abdominal: Soft. There is no tenderness. There is no rebound.   Musculoskeletal: She exhibits no edema.   Neurological: She is alert and oriented to person, place, and time.   Skin: Skin is warm and dry.   Nursing note and vitals reviewed.      Laboratory:  Recent Labs      04/27/19   1221   WBC  10.4   RBC  5.12   HEMOGLOBIN  15.0   HEMATOCRIT  43.9   MCV  85.7   MCH  29.3   MCHC  34.2   RDW  40.0   PLATELETCT  276   MPV  8.5*     Recent Labs      04/27/19   1221   SODIUM  135   POTASSIUM  3.6   CHLORIDE  106   CO2  22   GLUCOSE  89   BUN  15   CREATININE  0.87   CALCIUM  8.0*     Recent Labs      04/27/19   1221   ALTSGPT  13   ASTSGOT  15   ALKPHOSPHAT  58   TBILIRUBIN  0.5   GLUCOSE  89     Recent Labs      04/27/19   1438   APTT  193.1*   INR  1.17*             Recent Labs       04/27/19   1221   TROPONINI  0.16*       Urinalysis:    No results found     Imaging:  No orders to display         Assessment/Plan:  I anticipate this patient is appropriate for observation status at this time.    * NSTEMI (non-ST elevated myocardial infarction) ([facility])   Assessment & Plan    Troponin 0 0.16, EKG showed Q waves in V1-V3.  Her chest pain has stopped  Aspirin was given.  Continue on statin and heparin infusion.  Hold beta-blocker given low normal blood pressure, can start if she develops chest pain again  Cardiology consulted  Admit to telemetry.  Trend troponin and EKGs  Nitro and morphine as needed chest pain  Echocardiogram ordered  Ordered for lipid panel, HgbA1c  N.p.o. after midnight for cardiac cath tomorrow       Tobacco use   Assessment & Plan    Cessation counseling  Nicotine ordered     Essential hypertension   Assessment & Plan    Blood pressure is low normal   hold Prinzide  Start on losartan prior to discharge per cardiology     Hypothyroidism   Assessment & Plan    Continue Synthroid  Check TSH         VTE prophylaxis: heparin

## 2019-04-27 NOTE — ED PROVIDER NOTES
ED Provider Note    CHIEF COMPLAINT  Chief Complaint   Patient presents with   • Chest Pain   • Shortness of Breath       HPI  Leela Carmona is a 48 y.o. female who presents to the emergency department complaining of chest discomfort.  The patient was at work today when she started experiencing chest pain.  The pain is in the center of her chest described as pain and achiness.  It does not radiate.  It was associate shortness of breath, not feeling well and diaphoresis.  Lasted for about 15 to 20 minutes.  The pain seemed to improve but the diaphoresis persist.  Is not exertional or positional.  No cough fevers chills or hemoptysis.  No history of PE or DVT.  Denies any previous cardiac problems previous cardiac evaluation.  She has has a history of high blood pressure, tobacco use and a family history of heart disease.  No history of high cholesterol.  No drug abuse.  She denies any other aggravating alleviating factors or associated complaints.    REVIEW OF SYSTEMS  See HPI for further details. All other systems are negative.    PAST MEDICAL HISTORY  Past Medical History:   Diagnosis Date   • Psychiatric disorder    • Thyroid disorder        FAMILY HISTORY  No family history on file.    SOCIAL HISTORY  Social History     Social History   • Marital status: Single     Spouse name: N/A   • Number of children: N/A   • Years of education: N/A     Social History Main Topics   • Smoking status: Current Every Day Smoker     Packs/day: 0.50     Years: 33.00     Types: Cigarettes   • Smokeless tobacco: Never Used   • Alcohol use No   • Drug use: Yes   • Sexual activity: Yes     Partners: Male     Other Topics Concern   • Not on file     Social History Narrative   • No narrative on file       SURGICAL HISTORY  Past Surgical History:   Procedure Laterality Date   • PRIMARY C SECTION  4/4/2012    Performed by JAROCHO DOYLE at LABOR AND DELIVERY       CURRENT MEDICATIONS  Home Medications    **Home medications have  "not yet been reviewed for this encounter**         ALLERGIES  No Known Allergies    PHYSICAL EXAM  VITAL SIGNS: /75   Pulse 78   Temp 36.6 °C (97.8 °F) (Temporal)   Resp 18   Ht 1.676 m (5' 6\")   Wt 104 kg (229 lb 4.5 oz)   LMP 04/26/2019 (Exact Date)   SpO2 98%   BMI 37.01 kg/m²  *  Constitutional: Well developed, Well nourished, No acute distress, Non-toxic appearance.   HENT: Normocephalic, Atraumatic, Bilateral external ears normal, Oropharynx moist, No oral exudates, Nose normal.   Eyes: PERRL, EOMI, Conjunctiva normal, No discharge.   Neck: Normal range of motion, No tenderness, Supple, No  Cardiovascular: Normal heart rate, Normal rhythm, No murmurs, No rubs, No gallops.   Thorax & Lungs: Normal breath sounds, No respiratory distress, No wheezing  Abdomen: Bowel sounds normal, Soft, No tenderness,  Musculoskeletal: Good range of motion in all major joints. No tenderness to palpation or major deformities noted.   Neurologic: Alert,  No focal deficits noted.   Psychiatric: Affect normal,    Results for orders placed or performed during the hospital encounter of 04/27/19   CBC with Differential   Result Value Ref Range    WBC 10.4 4.8 - 10.8 K/uL    RBC 5.12 4.20 - 5.40 M/uL    Hemoglobin 15.0 12.0 - 16.0 g/dL    Hematocrit 43.9 37.0 - 47.0 %    MCV 85.7 81.4 - 97.8 fL    MCH 29.3 27.0 - 33.0 pg    MCHC 34.2 33.6 - 35.0 g/dL    RDW 40.0 35.9 - 50.0 fL    Platelet Count 276 164 - 446 K/uL    MPV 8.5 (L) 9.0 - 12.9 fL    Neutrophils-Polys 74.20 (H) 44.00 - 72.00 %    Lymphocytes 17.10 (L) 22.00 - 41.00 %    Monocytes 6.70 0.00 - 13.40 %    Eosinophils 0.80 0.00 - 6.90 %    Basophils 0.80 0.00 - 1.80 %    Immature Granulocytes 0.40 0.00 - 0.90 %    Nucleated RBC 0.00 /100 WBC    Neutrophils (Absolute) 7.75 (H) 2.00 - 7.15 K/uL    Lymphs (Absolute) 1.79 1.00 - 4.80 K/uL    Monos (Absolute) 0.70 0.00 - 0.85 K/uL    Eos (Absolute) 0.08 0.00 - 0.51 K/uL    Baso (Absolute) 0.08 0.00 - 0.12 K/uL    Immature " Granulocytes (abs) 0.04 0.00 - 0.11 K/uL    NRBC (Absolute) 0.00 K/uL   Complete Metabolic Panel (CMP)   Result Value Ref Range    Sodium 135 135 - 145 mmol/L    Potassium 3.6 3.6 - 5.5 mmol/L    Chloride 106 96 - 112 mmol/L    Co2 22 20 - 33 mmol/L    Anion Gap 7.0 0.0 - 11.9    Glucose 89 65 - 99 mg/dL    Bun 15 8 - 22 mg/dL    Creatinine 0.87 0.50 - 1.40 mg/dL    Calcium 8.0 (L) 8.4 - 10.2 mg/dL    AST(SGOT) 15 12 - 45 U/L    ALT(SGPT) 13 2 - 50 U/L    Alkaline Phosphatase 58 30 - 99 U/L    Total Bilirubin 0.5 0.1 - 1.5 mg/dL    Albumin 2.8 (L) 3.2 - 4.9 g/dL    Total Protein 5.0 (L) 6.0 - 8.2 g/dL    Globulin 2.2 1.9 - 3.5 g/dL    A-G Ratio 1.3 g/dL   Troponin   Result Value Ref Range    Troponin I 0.16 (H) 0.00 - 0.04 ng/mL   ESTIMATED GFR   Result Value Ref Range    GFR If African American >60 >60 mL/min/1.73 m 2    GFR If Non African American >60 >60 mL/min/1.73 m 2   EKG   Result Value Ref Range    Report       [facility] Emergency Dept.    Test Date:  2019  Pt Name:    FRANCO LOMAS                Department: [facility]  MRN:        [id number]                      Room:  Gender:     Female                       Technician: HARJINDER  :        1970                   Requested By:ER TRIAGE PROTOCOL  Order #:    [id number]                    Reading MD: DAMARIS BARRERA. AMD    Measurements  Intervals                                Axis  Rate:       73                           P:          34  MI:         153                          QRS:        28  QRSD:       92                           T:          24  QT:         383  QTc:        422    Interpretive Statements  Sinus rhythm  Low voltage, precordial leads  Anteroseptal infarct, old  Compared to ECG 2016 17:48:34  Low QRS voltage now present  Myocardial infarct finding still present    Electronically Signed On 2019 12:10:28 PDT by DAMARIS FAJARDO AMD     EKG (NOW)   Result Value Ref Range    Report       [facility]  [facility] Emergency Dept.    Test Date:  2019  Pt Name:    FRANCO LOMAS                Department: [facility]  MRN:        [id number]                      Room:       -ROOM 9  Gender:     Female                       Technician: [id number]  :        1970                   Requested By:DAMARIS BARRERA  Order #:    [id number]                    Reading MD:    Measurements  Intervals                                Axis  Rate:       70                           P:          18  WV:         159                          QRS:        3  QRSD:       90                           T:          17  QT:         398  QTc:        430    Interpretive Statements  Sinus rhythm  Anterior infarct, old  Compared to ECG 2019 11:42:23  No significant changes          RADIOLOGY/PROCEDURES  DX-CHEST-PORTABLE (1 VIEW)   Final Result      1.  There is no acute cardiopulmonary process.            COURSE & MEDICAL DECISION MAKING  Pertinent Labs & Imaging studies reviewed. (See chart for details)  Patient presents emerged part with anterior chest pain and pressure.  Describes achiness associate with diaphoresis.  Broad official diagnosis was considered including but not limited to ACS, chest wall pain, PE, MI, dissection.  Pneumonia and pneumothorax were also considered.    Chest x-ray is unremarkable none evidence of pneumonia pneumothorax.  Clinical history is not suggestive of a PE and she is PERC negative.    She does not have a history suggestive of a aortic dissection, and she does not have a wide mediastinum or pulse deficit.  I do not think we need to pursue this diagnosis further.    EKG does not show STEMI but there are some subtle ST changes.  There is concern for ACS she is given aspirin.  Labs were obtained troponin is mildly elevated.  Second EKG is obtained again is about the same as a previous.  She is currently pain-free.    Because of her history, cardiac risk factors, elevated troponin think she likely has an acute  coronary syndrome.  I spoke with Dr. Param Negrete on-call for cardiology who recommends transfer to the [facility] and heparin.    Patient cannot be transferred to ground via EMS with heparin infusion therefore we will get a heparin bolus.  She is turned to [facility].  Patient is apprised of the situation.  Spoke with Dr. Valenzuela at [facility] who will see the patient when she arrives.      Patient is transferred in guarded condition via EMS.    FINAL IMPRESSION  1. ACS (acute coronary syndrome) (HCC)        2.   3.         Electronically signed by: Cal Truong, 4/27/2019 12:07 PM

## 2019-04-27 NOTE — ED NOTES
Lab called with critical result of PTT of 193.1 at 1525. Critical lab result read back to .   Dr. Herrera (hospitalist) notified of critical lab result at 1526 by face to face.  Critical lab result read back by Dr. Herrera.

## 2019-04-27 NOTE — ASSESSMENT & PLAN NOTE
Troponin 0 0.16, EKG showed Q waves in V1-V3.  Her chest pain has stopped as of this AM  Aspirin was given.    Continue on statin and heparin infusion.    Cardiology consulted, plan for cath today  Nitro and morphine as needed chest pain  Follow up ECHO

## 2019-04-28 ENCOUNTER — APPOINTMENT (OUTPATIENT)
Dept: CARDIOLOGY | Facility: MEDICAL CENTER | Age: 49
DRG: 282 | End: 2019-04-28
Attending: INTERNAL MEDICINE
Payer: MEDICAID

## 2019-04-28 LAB
ANION GAP SERPL CALC-SCNC: 5 MMOL/L (ref 0–11.9)
APTT PPP: 111.9 SEC (ref 24.7–36)
APTT PPP: 130.1 SEC (ref 24.7–36)
BASOPHILS # BLD AUTO: 0.7 % (ref 0–1.8)
BASOPHILS # BLD: 0.07 K/UL (ref 0–0.12)
BUN SERPL-MCNC: 20 MG/DL (ref 8–22)
CALCIUM SERPL-MCNC: 8.1 MG/DL (ref 8.5–10.5)
CHLORIDE SERPL-SCNC: 111 MMOL/L (ref 96–112)
CHOLEST SERPL-MCNC: 152 MG/DL (ref 100–199)
CO2 SERPL-SCNC: 23 MMOL/L (ref 20–33)
CREAT SERPL-MCNC: 0.83 MG/DL (ref 0.5–1.4)
EKG IMPRESSION: NORMAL
EKG IMPRESSION: NORMAL
EOSINOPHIL # BLD AUTO: 0.17 K/UL (ref 0–0.51)
EOSINOPHIL NFR BLD: 1.6 % (ref 0–6.9)
ERYTHROCYTE [DISTWIDTH] IN BLOOD BY AUTOMATED COUNT: 42.9 FL (ref 35.9–50)
EST. AVERAGE GLUCOSE BLD GHB EST-MCNC: 111 MG/DL
GLUCOSE SERPL-MCNC: 99 MG/DL (ref 65–99)
HBA1C MFR BLD: 5.5 % (ref 0–5.6)
HCT VFR BLD AUTO: 40.4 % (ref 37–47)
HDLC SERPL-MCNC: 32 MG/DL
HGB BLD-MCNC: 13.5 G/DL (ref 12–16)
IMM GRANULOCYTES # BLD AUTO: 0.04 K/UL (ref 0–0.11)
IMM GRANULOCYTES NFR BLD AUTO: 0.4 % (ref 0–0.9)
LDLC SERPL CALC-MCNC: 64 MG/DL
LV EJECT FRACT MOD 2C 99903: 62.08
LV EJECT FRACT MOD 4C 99902: 65.62
LV EJECT FRACT MOD BP 99901: 65.68
LYMPHOCYTES # BLD AUTO: 2.7 K/UL (ref 1–4.8)
LYMPHOCYTES NFR BLD: 26 % (ref 22–41)
MCH RBC QN AUTO: 29.4 PG (ref 27–33)
MCHC RBC AUTO-ENTMCNC: 33.4 G/DL (ref 33.6–35)
MCV RBC AUTO: 88 FL (ref 81.4–97.8)
MONOCYTES # BLD AUTO: 0.83 K/UL (ref 0–0.85)
MONOCYTES NFR BLD AUTO: 8 % (ref 0–13.4)
NEUTROPHILS # BLD AUTO: 6.59 K/UL (ref 2–7.15)
NEUTROPHILS NFR BLD: 63.3 % (ref 44–72)
NRBC # BLD AUTO: 0 K/UL
NRBC BLD-RTO: 0 /100 WBC
PLATELET # BLD AUTO: 266 K/UL (ref 164–446)
PMV BLD AUTO: 9 FL (ref 9–12.9)
POTASSIUM SERPL-SCNC: 3.7 MMOL/L (ref 3.6–5.5)
RBC # BLD AUTO: 4.59 M/UL (ref 4.2–5.4)
SODIUM SERPL-SCNC: 139 MMOL/L (ref 135–145)
T4 FREE SERPL-MCNC: 1.35 NG/DL (ref 0.53–1.43)
TRIGL SERPL-MCNC: 280 MG/DL (ref 0–149)
TROPONIN I SERPL-MCNC: 1.33 NG/ML (ref 0–0.04)
TROPONIN I SERPL-MCNC: 3 NG/ML (ref 0–0.04)
TSH SERPL DL<=0.005 MIU/L-ACNC: 0.1 UIU/ML (ref 0.38–5.33)
WBC # BLD AUTO: 10.4 K/UL (ref 4.8–10.8)

## 2019-04-28 PROCEDURE — 84484 ASSAY OF TROPONIN QUANT: CPT | Mod: 91

## 2019-04-28 PROCEDURE — 700102 HCHG RX REV CODE 250 W/ 637 OVERRIDE(OP): Performed by: INTERNAL MEDICINE

## 2019-04-28 PROCEDURE — A9270 NON-COVERED ITEM OR SERVICE: HCPCS | Performed by: INTERNAL MEDICINE

## 2019-04-28 PROCEDURE — 80048 BASIC METABOLIC PNL TOTAL CA: CPT

## 2019-04-28 PROCEDURE — B2111ZZ FLUOROSCOPY OF MULTIPLE CORONARY ARTERIES USING LOW OSMOLAR CONTRAST: ICD-10-PCS | Performed by: INTERNAL MEDICINE

## 2019-04-28 PROCEDURE — A9270 NON-COVERED ITEM OR SERVICE: HCPCS | Performed by: HOSPITALIST

## 2019-04-28 PROCEDURE — B2151ZZ FLUOROSCOPY OF LEFT HEART USING LOW OSMOLAR CONTRAST: ICD-10-PCS | Performed by: INTERNAL MEDICINE

## 2019-04-28 PROCEDURE — 36415 COLL VENOUS BLD VENIPUNCTURE: CPT

## 2019-04-28 PROCEDURE — 700111 HCHG RX REV CODE 636 W/ 250 OVERRIDE (IP): Performed by: INTERNAL MEDICINE

## 2019-04-28 PROCEDURE — 99152 MOD SED SAME PHYS/QHP 5/>YRS: CPT | Performed by: INTERNAL MEDICINE

## 2019-04-28 PROCEDURE — 93005 ELECTROCARDIOGRAM TRACING: CPT | Performed by: INTERNAL MEDICINE

## 2019-04-28 PROCEDURE — 93010 ELECTROCARDIOGRAM REPORT: CPT | Mod: 76 | Performed by: INTERNAL MEDICINE

## 2019-04-28 PROCEDURE — 770020 HCHG ROOM/CARE - TELE (206)

## 2019-04-28 PROCEDURE — 700117 HCHG RX CONTRAST REV CODE 255: Performed by: INTERNAL MEDICINE

## 2019-04-28 PROCEDURE — 96376 TX/PRO/DX INJ SAME DRUG ADON: CPT

## 2019-04-28 PROCEDURE — 700102 HCHG RX REV CODE 250 W/ 637 OVERRIDE(OP): Performed by: HOSPITALIST

## 2019-04-28 PROCEDURE — 93306 TTE W/DOPPLER COMPLETE: CPT | Mod: 26 | Performed by: INTERNAL MEDICINE

## 2019-04-28 PROCEDURE — 84443 ASSAY THYROID STIM HORMONE: CPT

## 2019-04-28 PROCEDURE — 93010 ELECTROCARDIOGRAM REPORT: CPT | Performed by: INTERNAL MEDICINE

## 2019-04-28 PROCEDURE — 93458 L HRT ARTERY/VENTRICLE ANGIO: CPT | Mod: 26 | Performed by: INTERNAL MEDICINE

## 2019-04-28 PROCEDURE — 80061 LIPID PANEL: CPT

## 2019-04-28 PROCEDURE — 99233 SBSQ HOSP IP/OBS HIGH 50: CPT | Mod: 25 | Performed by: HOSPITALIST

## 2019-04-28 PROCEDURE — 99406 BEHAV CHNG SMOKING 3-10 MIN: CPT | Performed by: HOSPITALIST

## 2019-04-28 PROCEDURE — 85025 COMPLETE CBC W/AUTO DIFF WBC: CPT

## 2019-04-28 PROCEDURE — 96366 THER/PROPH/DIAG IV INF ADDON: CPT

## 2019-04-28 PROCEDURE — 85730 THROMBOPLASTIN TIME PARTIAL: CPT | Mod: 91

## 2019-04-28 PROCEDURE — 4A023N7 MEASUREMENT OF CARDIAC SAMPLING AND PRESSURE, LEFT HEART, PERCUTANEOUS APPROACH: ICD-10-PCS | Performed by: INTERNAL MEDICINE

## 2019-04-28 PROCEDURE — 700117 HCHG RX CONTRAST REV CODE 255: Performed by: HOSPITALIST

## 2019-04-28 PROCEDURE — 700111 HCHG RX REV CODE 636 W/ 250 OVERRIDE (IP)

## 2019-04-28 PROCEDURE — 700101 HCHG RX REV CODE 250

## 2019-04-28 PROCEDURE — 93458 L HRT ARTERY/VENTRICLE ANGIO: CPT

## 2019-04-28 PROCEDURE — 83036 HEMOGLOBIN GLYCOSYLATED A1C: CPT

## 2019-04-28 PROCEDURE — 84439 ASSAY OF FREE THYROXINE: CPT

## 2019-04-28 PROCEDURE — 93306 TTE W/DOPPLER COMPLETE: CPT

## 2019-04-28 RX ORDER — MIDAZOLAM HYDROCHLORIDE 1 MG/ML
INJECTION INTRAMUSCULAR; INTRAVENOUS
Status: COMPLETED
Start: 2019-04-28 | End: 2019-04-28

## 2019-04-28 RX ORDER — HEPARIN SODIUM,PORCINE 1000/ML
VIAL (ML) INJECTION
Status: COMPLETED
Start: 2019-04-28 | End: 2019-04-28

## 2019-04-28 RX ORDER — VERAPAMIL HYDROCHLORIDE 2.5 MG/ML
INJECTION, SOLUTION INTRAVENOUS
Status: COMPLETED
Start: 2019-04-28 | End: 2019-04-28

## 2019-04-28 RX ORDER — METOPROLOL SUCCINATE 25 MG/1
25 TABLET, EXTENDED RELEASE ORAL
Status: DISCONTINUED | OUTPATIENT
Start: 2019-04-28 | End: 2019-04-29 | Stop reason: HOSPADM

## 2019-04-28 RX ORDER — DIPHENHYDRAMINE HYDROCHLORIDE 50 MG/ML
INJECTION INTRAMUSCULAR; INTRAVENOUS
Status: COMPLETED
Start: 2019-04-28 | End: 2019-04-28

## 2019-04-28 RX ORDER — LISINOPRIL 5 MG/1
5 TABLET ORAL
Status: DISCONTINUED | OUTPATIENT
Start: 2019-04-28 | End: 2019-04-29 | Stop reason: HOSPADM

## 2019-04-28 RX ORDER — LEVOTHYROXINE SODIUM 0.07 MG/1
150 TABLET ORAL EVERY EVENING
Status: DISCONTINUED | OUTPATIENT
Start: 2019-04-28 | End: 2019-04-29 | Stop reason: HOSPADM

## 2019-04-28 RX ORDER — ZOLPIDEM TARTRATE 5 MG/1
2.5 TABLET ORAL NIGHTLY PRN
Status: DISCONTINUED | OUTPATIENT
Start: 2019-04-28 | End: 2019-04-29 | Stop reason: HOSPADM

## 2019-04-28 RX ORDER — LIDOCAINE HYDROCHLORIDE 20 MG/ML
INJECTION, SOLUTION INFILTRATION; PERINEURAL
Status: COMPLETED
Start: 2019-04-28 | End: 2019-04-28

## 2019-04-28 RX ADMIN — ATORVASTATIN CALCIUM 80 MG: 80 TABLET, FILM COATED ORAL at 16:39

## 2019-04-28 RX ADMIN — LEVOTHYROXINE SODIUM 150 MCG: 75 TABLET ORAL at 16:39

## 2019-04-28 RX ADMIN — HUMAN ALBUMIN MICROSPHERES AND PERFLUTREN 3 ML: 10; .22 INJECTION, SOLUTION INTRAVENOUS at 10:39

## 2019-04-28 RX ADMIN — FENTANYL CITRATE 100 MCG: 50 INJECTION, SOLUTION INTRAMUSCULAR; INTRAVENOUS at 11:54

## 2019-04-28 RX ADMIN — HEPARIN SODIUM: 1000 INJECTION, SOLUTION INTRAVENOUS; SUBCUTANEOUS at 11:18

## 2019-04-28 RX ADMIN — ZOLPIDEM TARTRATE 2.5 MG: 5 TABLET ORAL at 00:13

## 2019-04-28 RX ADMIN — ACETAMINOPHEN 650 MG: 325 TABLET, FILM COATED ORAL at 06:54

## 2019-04-28 RX ADMIN — NITROGLYCERIN 10 ML: 20 INJECTION INTRAVENOUS at 11:18

## 2019-04-28 RX ADMIN — ASPIRIN 81 MG: 81 TABLET, COATED ORAL at 05:14

## 2019-04-28 RX ADMIN — DIPHENHYDRAMINE HYDROCHLORIDE 50 MG: 50 INJECTION INTRAMUSCULAR; INTRAVENOUS at 11:55

## 2019-04-28 RX ADMIN — HEPARIN SODIUM 25000 UNITS: 5000 INJECTION, SOLUTION INTRAVENOUS at 10:50

## 2019-04-28 RX ADMIN — LISINOPRIL 5 MG: 5 TABLET ORAL at 13:33

## 2019-04-28 RX ADMIN — NICOTINE 14 MG: 14 PATCH, EXTENDED RELEASE TRANSDERMAL at 05:16

## 2019-04-28 RX ADMIN — CITALOPRAM HYDROBROMIDE 40 MG: 20 TABLET ORAL at 16:39

## 2019-04-28 RX ADMIN — ZOLPIDEM TARTRATE 2.5 MG: 5 TABLET ORAL at 21:55

## 2019-04-28 RX ADMIN — MIDAZOLAM HYDROCHLORIDE 2 MG: 1 INJECTION, SOLUTION INTRAMUSCULAR; INTRAVENOUS at 11:54

## 2019-04-28 RX ADMIN — IOHEXOL 28 ML: 350 INJECTION, SOLUTION INTRAVENOUS at 11:55

## 2019-04-28 RX ADMIN — LIDOCAINE HYDROCHLORIDE: 20 INJECTION, SOLUTION INFILTRATION; PERINEURAL at 11:18

## 2019-04-28 RX ADMIN — VERAPAMIL HYDROCHLORIDE 2.5 MG: 2.5 INJECTION, SOLUTION INTRAVENOUS at 11:19

## 2019-04-28 RX ADMIN — HEPARIN SODIUM 3200 UNITS: 1000 INJECTION, SOLUTION INTRAVENOUS; SUBCUTANEOUS at 00:13

## 2019-04-28 RX ADMIN — METOPROLOL SUCCINATE 25 MG: 25 TABLET, EXTENDED RELEASE ORAL at 13:32

## 2019-04-28 RX ADMIN — HEPARIN SODIUM 2000 UNITS: 1000 INJECTION, SOLUTION INTRAVENOUS; SUBCUTANEOUS at 11:18

## 2019-04-28 ASSESSMENT — ENCOUNTER SYMPTOMS
BLURRED VISION: 0
TINGLING: 0
SENSORY CHANGE: 0
FEVER: 0
TREMORS: 0
HEARTBURN: 0
NAUSEA: 0
CHILLS: 0
VOMITING: 0

## 2019-04-28 NOTE — PROCEDURES
Cardiac Catheterization report    4/28/2019  12:05 PM    Primary Care Provider: Sandeep Espinosa M.D.    Indication for procedure: Non-ST elevation myocardial infarction    Procedure:  ·  Coronary arteriograms  · Left heart catheterization, Left ventriculogram    Complications:  None.    EBL: <10 CC    Specimens: None    Procedure:  The risks and benefits of cardiac catheterization and possible intervention were explained to the patient including death, heart attack, stroke, and emergency surgery.  The patient verbalized understanding and wished to proceed.  The patient was brought to the cardiac catheterization laboratory in the fasting state and prepped and draped in the usual sterile fashion.  The right wrist was locally anesthetized with lidocaine and the right radial artery was cannulated with 5/6-Spanish equipment and standard radial cocktail was given.  Coronary angiography was performed using JR 4 and JL 3.5  diagnostic catheters in the usual fashion, results mentioned below.  JR 4 catheter was used to cross the aortic valve to perform  left heart catheterization and left ventriculogram.    1.  Left main coronary artery:  Normal.  2.  Left anterior descending artery:  Luminal irregularities.   3.  Left circumflex coronary artery:  Normal. Gives two marginal branches, which have no significant disease   4.  Right coronary artery:  Normal.  This is a right dominant system.  5.  Left ventricular end diastolic pressure:   21mmHg.  No signficant gradient across the aortic valve.  6.  Left ventriculogram:  Ejection fraction of 40%. Stress cardiomyopathy.    Once all the views were obtained, all wires and catheters were removed from the patient without difficulty.  A Vasc-Band was placed over the right radial artery and the radial artery sheath was removed without difficulty.      Complications:  There was no evidence of hematoma or other complications.  The patient was transferred to the recovery area in stable  condition.     Impression:  Stress cardiomyopathy.    Recommendations:  Guideline directed medical therapy   Cardiovascular Risk factor modification    Sedation time:  I supervised moderate sedation over a trained independent nursing staff,  Sedation Start time: 11:39      Sedation Stop time: 11:54       ADELIA Luna  [facility]

## 2019-04-28 NOTE — PROGRESS NOTES
Assumed care of pt, she is alert and oriented/denies pain. Discussed POC with pt and dayshift RN. Discussed safety and fall rationale. Bed is locked in lowest position and call light/personal belongings are within reach.

## 2019-04-28 NOTE — PROGRESS NOTES
[facility] Medicine Daily Progress Note    Date of Service  4/28/2019    Chief Complaint  48 y.o. female admitted 4/27/2019 with chest pain    Hospital Course    48 y.o. female who presented 4/27/2019 to [facility] with tobacco use, hypertension, c/o chest pressure without radiation. Associated with shortness of breath and diaphoresis. Troponin was positive at 0.16, EKG showed Q waves in V1-V3. Troponin peaked at 3 when at regional. Evaluated by cardiology, cardiac cath pending.       Interval Problem Update  Was feeling well this AM, no questions about procedure   No CP or SOB  CBC and BMP wnl  Trop downtrending  TSH 0.1    Consultants/Specialty  Cardiology    Code Status  Full    Disposition  Inpatient on tele, cath today    Review of Systems  Review of Systems   Constitutional: Negative for chills and fever.   Eyes: Negative for blurred vision.   Cardiovascular: Negative for chest pain.   Gastrointestinal: Negative for heartburn, nausea and vomiting.   Skin: Negative for itching and rash.   Neurological: Negative for tingling, tremors and sensory change.   All other systems reviewed and are negative.       Physical Exam  Temp:  [36.3 °C (97.3 °F)-37.2 °C (98.9 °F)] 37 °C (98.6 °F)  Pulse:  [51-79] 79  Resp:  [14-17] 16  BP: ()/(58-90) 120/64  SpO2:  [91 %-100 %] 97 %    Physical Exam   Constitutional: She is oriented to person, place, and time. She appears well-developed and well-nourished.   HENT:   Head: Normocephalic and atraumatic.   Mouth/Throat: Oropharynx is clear and moist.   Eyes: Conjunctivae and EOM are normal. No scleral icterus.   Neck: Normal range of motion. Neck supple. No JVD present.   Cardiovascular: Normal rate and normal heart sounds.    Pulmonary/Chest: Effort normal and breath sounds normal. No respiratory distress. She has no rales. She exhibits no tenderness.   Abdominal: Soft. Bowel sounds are normal. She exhibits no distension. There is no tenderness.   Musculoskeletal: Normal range of  motion. She exhibits no edema, tenderness or deformity.   Neurological: She is alert and oriented to person, place, and time. No cranial nerve deficit.   Skin: Skin is warm and dry.   Psychiatric: She has a normal mood and affect. Her behavior is normal.   Nursing note and vitals reviewed.      Fluids  No intake or output data in the 24 hours ending 04/28/19 1404    Laboratory  Recent Labs      04/27/19   1221  04/28/19   0025   WBC  10.4  10.4   RBC  5.12  4.59   HEMOGLOBIN  15.0  13.5   HEMATOCRIT  43.9  40.4   MCV  85.7  88.0   MCH  29.3  29.4   MCHC  34.2  33.4*   RDW  40.0  42.9   PLATELETCT  276  266   MPV  8.5*  9.0     Recent Labs      04/27/19   1221  04/28/19   0025   SODIUM  135  139   POTASSIUM  3.6  3.7   CHLORIDE  106  111   CO2  22  23   GLUCOSE  89  99   BUN  15  20   CREATININE  0.87  0.83   CALCIUM  8.0*  8.1*     Recent Labs      04/27/19   1438  04/27/19   2247  04/28/19   0605   APTT  193.1*  47.8*  111.9*   INR  1.17*   --    --          Recent Labs      04/28/19   0025   TRIGLYCERIDE  280*   HDL  32*   LDL  64       Imaging  EC-ECHOCARDIOGRAM COMPLETE W/ CONT         CL-LEFT HEART CATHETERIZATION WITH POSSIBLE INTERVENTION    (Results Pending)      Assessment/Plan  * NSTEMI (non-ST elevated myocardial infarction) ([facility])   Assessment & Plan    Troponin 0 0.16, EKG showed Q waves in V1-V3.  Her chest pain has stopped as of this AM  Aspirin was given.    Continue on statin and heparin infusion.    Cardiology consulted, plan for cath today  Nitro and morphine as needed chest pain  Follow up ECHO       Tobacco use   Assessment & Plan    4 minutes spent in cessation counseling  Nicotine ordered     Essential hypertension   Assessment & Plan    Blood pressure is low normal   hold Prinzide  Start on losartan prior to discharge per cardiology     Hypothyroidism   Assessment & Plan    Continue Synthroid  TSH 0.1  Will lower LT4 to 150mcg/d  Follow up outpatient for recheck        VTE prophylaxis:  heparin

## 2019-04-28 NOTE — THERAPY
Physical Therapy Contact Note    PT consult received and acknowledged. Per chart review patient pending cardiac cath 4/28. Will hold PT eval until after cardiac diagnostics complete and POC established.    Leela Palafox, PT, DPT  [phone]

## 2019-04-28 NOTE — PROGRESS NOTES
Patient has critical aptt lab value that resulted after heparin bolus was given at OSH. Spoke with pharmacy who stated OK to initiate IV heparin protocol and run at rate per protocol. Dr. Herrera admitted patient and aware of critical value and OK with starting IV heparin. Will order aptt 6hrs post initiation of IV heparin.

## 2019-04-28 NOTE — PROGRESS NOTES
Tried deflating TR band and after 2 mL of air taken out site started oozing right away, reinflated TR band with 2 mL of air and oozing stopped, 2+ pulses and skin is warm. No numbness or tingling per pt

## 2019-04-29 ENCOUNTER — PATIENT OUTREACH (OUTPATIENT)
Dept: HEALTH INFORMATION MANAGEMENT | Facility: OTHER | Age: 49
End: 2019-04-29

## 2019-04-29 VITALS
DIASTOLIC BLOOD PRESSURE: 54 MMHG | SYSTOLIC BLOOD PRESSURE: 107 MMHG | HEART RATE: 57 BPM | WEIGHT: 228.18 LBS | OXYGEN SATURATION: 93 % | TEMPERATURE: 97.6 F | RESPIRATION RATE: 17 BRPM | HEIGHT: 66 IN | BODY MASS INDEX: 36.67 KG/M2

## 2019-04-29 LAB
ANION GAP SERPL CALC-SCNC: 5 MMOL/L (ref 0–11.9)
BUN SERPL-MCNC: 18 MG/DL (ref 8–22)
CALCIUM SERPL-MCNC: 8.3 MG/DL (ref 8.5–10.5)
CHLORIDE SERPL-SCNC: 110 MMOL/L (ref 96–112)
CO2 SERPL-SCNC: 25 MMOL/L (ref 20–33)
CREAT SERPL-MCNC: 0.91 MG/DL (ref 0.5–1.4)
GLUCOSE SERPL-MCNC: 89 MG/DL (ref 65–99)
POTASSIUM SERPL-SCNC: 4.6 MMOL/L (ref 3.6–5.5)
SODIUM SERPL-SCNC: 140 MMOL/L (ref 135–145)

## 2019-04-29 PROCEDURE — 700102 HCHG RX REV CODE 250 W/ 637 OVERRIDE(OP): Performed by: INTERNAL MEDICINE

## 2019-04-29 PROCEDURE — 99239 HOSP IP/OBS DSCHRG MGMT >30: CPT | Mod: 25 | Performed by: HOSPITALIST

## 2019-04-29 PROCEDURE — A9270 NON-COVERED ITEM OR SERVICE: HCPCS | Performed by: INTERNAL MEDICINE

## 2019-04-29 PROCEDURE — 36415 COLL VENOUS BLD VENIPUNCTURE: CPT

## 2019-04-29 PROCEDURE — 99406 BEHAV CHNG SMOKING 3-10 MIN: CPT | Performed by: HOSPITALIST

## 2019-04-29 PROCEDURE — 80048 BASIC METABOLIC PNL TOTAL CA: CPT

## 2019-04-29 PROCEDURE — 99232 SBSQ HOSP IP/OBS MODERATE 35: CPT | Performed by: INTERNAL MEDICINE

## 2019-04-29 RX ORDER — ATORVASTATIN CALCIUM 80 MG/1
40 TABLET, FILM COATED ORAL EVERY EVENING
Qty: 30 TAB | Refills: 3 | Status: SHIPPED | OUTPATIENT
Start: 2019-04-29 | End: 2019-05-22 | Stop reason: SDUPTHER

## 2019-04-29 RX ORDER — ASPIRIN 81 MG/1
81 TABLET ORAL DAILY
Qty: 30 TAB | Refills: 0 | Status: SHIPPED | OUTPATIENT
Start: 2019-04-30

## 2019-04-29 RX ORDER — METOPROLOL SUCCINATE 25 MG/1
25 TABLET, EXTENDED RELEASE ORAL DAILY
Qty: 30 TAB | Refills: 3 | Status: SHIPPED | OUTPATIENT
Start: 2019-04-30 | End: 2019-05-22 | Stop reason: SDUPTHER

## 2019-04-29 RX ORDER — LISINOPRIL 5 MG/1
5 TABLET ORAL DAILY
Qty: 30 TAB | Refills: 3 | Status: SHIPPED | OUTPATIENT
Start: 2019-04-30 | End: 2019-05-22 | Stop reason: SDUPTHER

## 2019-04-29 RX ORDER — NICOTINE 21 MG/24HR
1 PATCH, TRANSDERMAL 24 HOURS TRANSDERMAL EVERY 24 HOURS
Qty: 30 PATCH | Refills: 0 | Status: SHIPPED | OUTPATIENT
Start: 2019-04-29 | End: 2019-10-18

## 2019-04-29 RX ORDER — LEVOTHYROXINE SODIUM 0.15 MG/1
150 TABLET ORAL EVERY EVENING
Qty: 90 TAB | Refills: 0 | Status: SHIPPED | OUTPATIENT
Start: 2019-04-29

## 2019-04-29 RX ADMIN — ASPIRIN 81 MG: 81 TABLET, COATED ORAL at 04:41

## 2019-04-29 RX ADMIN — LISINOPRIL 5 MG: 5 TABLET ORAL at 04:41

## 2019-04-29 RX ADMIN — METOPROLOL SUCCINATE 25 MG: 25 TABLET, EXTENDED RELEASE ORAL at 04:41

## 2019-04-29 RX ADMIN — NICOTINE 14 MG: 14 PATCH, EXTENDED RELEASE TRANSDERMAL at 04:42

## 2019-04-29 ASSESSMENT — ENCOUNTER SYMPTOMS
ABDOMINAL PAIN: 0
CONFUSION: 0
DIAPHORESIS: 0
COLOR CHANGE: 0
NERVOUS/ANXIOUS: 0
COUGH: 0
DIZZINESS: 0
NUMBNESS: 0
BLOOD IN STOOL: 0
AGITATION: 0
TROUBLE SWALLOWING: 0
FEVER: 0
ABDOMINAL DISTENTION: 0
CHEST TIGHTNESS: 0
PALPITATIONS: 0
CHILLS: 0
SHORTNESS OF BREATH: 0

## 2019-04-29 ASSESSMENT — LIFESTYLE VARIABLES
TOTAL SCORE: 0
ON A TYPICAL DAY WHEN YOU DRINK ALCOHOL HOW MANY DRINKS DO YOU HAVE: 2
HAVE YOU EVER FELT YOU SHOULD CUT DOWN ON YOUR DRINKING: NO
TOTAL SCORE: 0
HOW MANY TIMES IN THE PAST YEAR HAVE YOU HAD 5 OR MORE DRINKS IN A DAY: 0
AVERAGE NUMBER OF DAYS PER WEEK YOU HAVE A DRINK CONTAINING ALCOHOL: 2
EVER HAD A DRINK FIRST THING IN THE MORNING TO STEADY YOUR NERVES TO GET RID OF A HANGOVER: NO
EVER FELT BAD OR GUILTY ABOUT YOUR DRINKING: NO
ALCOHOL_USE: NO
HAVE PEOPLE ANNOYED YOU BY CRITICIZING YOUR DRINKING: NO
CONSUMPTION TOTAL: NEGATIVE
TOTAL SCORE: 0

## 2019-04-29 NOTE — DISCHARGE PLANNING
Anticipated Discharge Disposition: D/C to Home/Self-Care    Action: LSW met with MD to discuss concerns regarding medications. Per MD, pt will be prescribed medications that should be affordable to pt. MD will send pt's scripts to [facility] so LSW can check price prior to pt being discharged.     Barriers to Discharge: None.    Plan: LSW to call pt's pharmacy and check on cash price prior to pt discharging.     Addendum: LSW spoke with [facility] and was informed that pt's medications at the cash price is approximately $50. LSW met with pt to discuss the cost and pt confirmed that she will borrow money from someone to purchase the medications for this month. Pt's Medicaid is anticipated to be approved within the next few weeks.

## 2019-04-29 NOTE — DISCHARGE SUMMARY
Discharge Summary    CHIEF COMPLAINT ON ADMISSION  Chief Complaint   Patient presents with   • Chest Pain     aching chest pain onset of 1115 while at work, + troponin at [facility]        Reason for Admission  Chest Pain     Admission Date  4/27/2019    CODE STATUS  Full Code    HPI & HOSPITAL COURSE    48 y.o. female who presented 4/27/2019 to [facility] with tobacco use, hypertension, c/o chest pressure without radiation. Associated with shortness of breath and diaphoresis. Troponin was positive at 0.16, EKG showed Q waves in V1-V3. Troponin peaked at 3 when at [facility]. Evaluated by cardiology, cardiac cath negative for significant coronary disease. ECHO then obtained showed:    Low normal left ventricular systolic function.  Left ventricular ejection fraction is visually estimated to be 50%.  Hypokinetic LV apex.  Contrast study, no apical thrombus.  Mild aortic insufficiency.  Unable to estimate pulmonary artery pressure due to an inadequate   tricuspid regurgitant jet.  Normal inferior vena cava size and inspiratory collapse.    Diagnosed with stress-related CM. On BB and Lisinopril. SBP 100s on Lisinopril 5 and Toprol 25. Sent Atorv 40 to pharmacy but she is unsure if she can afford this additional medication right now. Pending Medicaid enrollment.    She follows up with [facility] primary care. We discussed tobacco cessation again for 5 minutes, advised her to follow up for discussion about Chantix or Wellbutrin, however she is nervous since she is already on Celexa. Will discharge with cecile patches x 1 month.     Seen ambulating in [place]. Looks good. No chest pain or SOB. No cough. Lungs CTA.      Therefore, she is discharged in good and stable condition to home with close outpatient follow-up.    The patient met 2-midnight criteria for an inpatient stay at the time of discharge.    Discharge Date  4/29/2019    FOLLOW UP ITEMS POST DISCHARGE  Follow up [facility]  See Cardiology, may need meds titrated up, repeat ECHO 6  months?  Discuss smoking cessation with [facility] clinic  Adjusted levothyroxine dose, now on 150mcg/d    DISCHARGE DIAGNOSES  Principal Problem:    NSTEMI (non-ST elevated myocardial infarction) (HCC) POA: Unknown  Active Problems:    Hypothyroidism POA: Unknown    Essential hypertension POA: Unknown    Tobacco use POA: Unknown  Resolved Problems:    * No resolved hospital problems. *    FOLLOW UP  No future appointments.  Sandeep Espinosa M.D.  [id number]  [address]  [place] [address]  [phone]    Schedule an appointment as soon as possible for a visit in 1 week  Hospital follow-up appointment with PCP      MEDICATIONS ON DISCHARGE     Medication List      ASK your doctor about these medications      Instructions   citalopram 40 MG Tabs  Commonly known as:  CELEXA   Take 40 mg by mouth every day.  Dose:  40 mg     lisinopril-hydrochlorothiazide 20-12.5 MG per tablet  Commonly known as:  PRINZIDE, ZESTORETIC   Take 1 Tab by mouth every day.  Dose:  1 Tab     SYNTHROID 175 MCG Tabs  Generic drug:  levothyroxine   Take 175 mcg by mouth every evening.  Dose:  175 mcg          Allergies  No Known Allergies    DIET  Orders Placed This Encounter   Procedures   • Diet Order Cardiac     Standing Status:   Standing     Number of Occurrences:   1     Order Specific Question:   Diet:     Answer:   Cardiac [6]     ACTIVITY  As tolerated.  Weight bearing as tolerated    CONSULTATIONS  Cardiology    PROCEDURES  Cath    LABORATORY  Lab Results   Component Value Date    SODIUM 140 04/29/2019    POTASSIUM 4.6 04/29/2019    CHLORIDE 110 04/29/2019    CO2 25 04/29/2019    GLUCOSE 89 04/29/2019    BUN 18 04/29/2019    CREATININE 0.91 04/29/2019        Lab Results   Component Value Date    WBC 10.4 04/28/2019    HEMOGLOBIN 13.5 04/28/2019    HEMATOCRIT 40.4 04/28/2019    PLATELETCT 266 04/28/2019      Total time of the discharge process exceeds 40 minutes.

## 2019-04-29 NOTE — DISCHARGE PLANNING
Care Transition Team Assessment    LSW met with pt at bedside to complete assessment. Pt lives at [address] [place] [address] with her roommate, Kye Ballard. She is employed at [employer] as a  and plans to return to work once she is discharged home. Pt is independent with all IADLs/ADLs at baseline and does not use/have any DME at home. Pt is currently pending [place] Medicaid, her ability to obtain medications is limited given she has not been at work and relies on tips. Pt's primary supports are her friends and her boyfriend. Pt has a car and drives herself.    Information Source  Orientation : Oriented x 4  Information Given By: Patient  Informant's Name: Leela Carmona  Who is responsible for making decisions for patient? : Patient    Readmission Evaluation  Is this a readmission?: No    Elopement Risk  Legal Hold: No  Ambulatory or Self Mobile in Wheelchair: Yes  Disoriented: No  Psychiatric Symptoms: None  History of Wandering: No  Elopement this Admit: No  Vocalizing Wanting to Leave: No  Displays Behaviors, Body Language Wanting to Leave: No-Not at Risk for Elopement  Elopement Risk: Not at Risk for Elopement    Interdisciplinary Discharge Planning  Patient or legal guardian wants to designate a caregiver (see row info): No    Discharge Preparedness   What is your plan after discharge?: Home with help  What are your discharge supports?: Partner, Other (comment) (Junior (boyfriend), Kye Ballard (roommate), Isai (Friend))  Prior Functional Level: Ambulatory, Drives Self, Independent with Activities of Daily Living, Independent with Medication Management  Difficulity with ADLs: None  Difficulity with IADLs: None    Functional Assesment  Prior Functional Level: Ambulatory, Drives Self, Independent with Activities of Daily Living, Independent with Medication Management    Finances  Financial Barriers to Discharge: No  Prescription Coverage: No  Prescription Coverage Comments: Pending  Medicaid    Vision / Hearing Impairment  Vision Impairment : Yes  Right Eye Vision: Impaired, Wears Glasses  Left Eye Vision: Impaired, Wears Glasses  Hearing Impairment : No    Advance Directive  Advance Directive?: None    Domestic Abuse  Have you ever been the victim of abuse or violence?: No  Physical Abuse or Sexual Abuse: No  Verbal Abuse or Emotional Abuse: No  Possible Abuse Reported to:: Not Applicable    Discharge Risks or Barriers  Patient risk factors: Uninsured or underinsured    Anticipated Discharge Information  Anticipated discharge disposition: Home  Discharge Address: [address] [place] [address]  Discharge Contact Phone Number: [phone]

## 2019-04-29 NOTE — PROGRESS NOTES
Pt discharged to home with . All belongings sent with patient. Pt verbalized understanding of f/u appts, meds, instructions. All IVs d/c and tele box removed.

## 2019-04-29 NOTE — DISCHARGE INSTRUCTIONS
Discharge Instructions    Discharged to home by car with relative. Discharged via walking, hospital escort: Refused.  Special equipment needed: none     Be sure to schedule a follow-up appointment with your primary care doctor or any specialists as instructed.     Discharge Plan:   Diet Plan: Discussed  Activity Level: Discussed  Smoking Cessation Offered: Patient Counseled  Confirmed Follow up Appointment: Appointment Scheduled  Confirmed Symptoms Management: Discussed  Medication Reconciliation Updated: Yes  Pneumococcal Vaccine Administered/Refused: Not given - Patient refused pneumococcal vaccine  Influenza Vaccine Indication: Not indicated: Previously immunized this influenza season and > 8 years of age    I understand that a diet low in cholesterol, fat, and sodium is recommended for good health. Unless I have been given specific instructions below for another diet, I accept this instruction as my diet prescription.   Other diet: low salt     Special Instructions: Diagnosis:  Acute Coronary Syndrome (ACS) is a diagnosis that encompasses cardiac-related chest pain and heart attack. ACS occurs when the blood flow to the heart muscle is severely reduced or cut off completely due to a slow process called atherosclerosis.  Atherosclerosis is a disease in which the coronary arteries become narrow from a buildup of fat, cholesterol, and other substances that combine to form plaque. If the plaque breaks, a blood clot will form and block the blood flow to the heart muscle. This lack of blood flow can cause damage or death to the heart muscle which is called a heart attack or Myocardial Infarction (MI). There are two different types of MIs:  ST Elevation Myocardial Infarction or STEMI (the most severe type of heart attack) and Non-ST Elevation Myocardial Infarction or NSTEMI.    Treatment Plan:  · Cardiac Diet  - Low fat, low salt, low cholesterol   · Cardiac Rehab  - Your doctor has ordered you a referral to [facility].  Call [phone] to schedule an appointment.  · Attend my follow-up appointment with my Cardiologist.  · Take my medications as prescribed by my doctor  · Exercise daily  · Quit Smoking    Medications:  Certain medications are used to treat ACS.  Remember to always take medications as prescribed and never stop talking medications unless told by your doctor.    You have been prescribed the following medicatons:    Beta-Blocker - Metoprolol is used to lower blood pressure and heart rate, and/or helps your heart heal after a heart attack.    · Is patient discharged on Warfarin / Coumadin?   No     Depression / Suicide Risk    As you are discharged from this [facility] facility, it is important to learn how to keep safe from harming yourself.    Recognize the warning signs:  · Abrupt changes in personality, positive or negative- including increase in energy   · Giving away possessions  · Change in eating patterns- significant weight changes-  positive or negative  · Change in sleeping patterns- unable to sleep or sleeping all the time   · Unwillingness or inability to communicate  · Depression  · Unusual sadness, discouragement and loneliness  · Talk of wanting to die  · Neglect of personal appearance   · Rebelliousness- reckless behavior  · Withdrawal from people/activities they love  · Confusion- inability to concentrate     If you or a loved one observes any of these behaviors or has concerns about self-harm, here's what you can do:  · Talk about it- your feelings and reasons for harming yourself  · Remove any means that you might use to hurt yourself (examples: pills, rope, extension cords, firearm)  · Get professional help from the community (Mental Health, Substance Abuse, psychological counseling)  · Do not be alone:Call your Safe Contact- someone whom you trust who will be there for you.  · Call your local CRISIS HOTLINE 904-7517 or 134-386-0583  · Call your local Children's Mobile Crisis Response Team  [facility] (696) 649-0158 or www.Brightergy  · Call the toll free National Suicide Prevention Hotlines   · National Suicide Prevention Lifeline 331-429-SMCO (9301)  · National Hope Line Network 800-SUICIDE (074-8591)  Angiogram, Care After  Refer to this sheet in the next few weeks. These instructions provide you with information about caring for yourself after your procedure. Your health care provider may also give you more specific instructions. Your treatment has been planned according to current medical practices, but problems sometimes occur. Call your health care provider if you have any problems or questions after your procedure.  WHAT TO EXPECT AFTER THE PROCEDURE  After your procedure, it is typical to have the following:  · Bruising at the catheter insertion site that usually fades within 1-2 weeks.  · Blood collecting in the tissue (hematoma) that may be painful to the touch. It should usually decrease in size and tenderness within 1-2 weeks.  HOME CARE INSTRUCTIONS  · Take medicines only as directed by your health care provider.  · You may shower 24-48 hours after the procedure or as directed by your health care provider. Remove the bandage (dressing) and gently wash the site with plain soap and water. Pat the area dry with a clean towel. Do not rub the site, because this may cause bleeding.  · Do not take baths, swim, or use a hot tub until your health care provider approves.  · Check your insertion site every day for redness, swelling, or drainage.  · Do not apply powder or lotion to the site.  · Do not lift over 10 lb (4.5 kg) for 5 days after your procedure or as directed by your health care provider.  · Ask your health care provider when it is okay to:  ¨ Return to work or school.  ¨ Resume usual physical activities or sports.  ¨ Resume sexual activity.  · Do not drive home if you are discharged the same day as the procedure. Have someone else drive you.  · You may drive 24 hours after the  procedure unless otherwise instructed by your health care provider.  · Do not operate machinery or power tools for 24 hours after the procedure or as directed by your health care provider.  · If your procedure was done as an outpatient procedure, which means that you went home the same day as your procedure, a responsible adult should be with you for the first 24 hours after you arrive home.  · Keep all follow-up visits as directed by your health care provider. This is important.  SEEK MEDICAL CARE IF:  · You have a fever.  · You have chills.  · You have increased bleeding from the catheter insertion site. Hold pressure on the site.  SEEK IMMEDIATE MEDICAL CARE IF:  · You have unusual pain at the catheter insertion site.  · You have redness, warmth, or swelling at the catheter insertion site.  · You have drainage (other than a small amount of blood on the dressing) from the catheter insertion site.  · The catheter insertion site is bleeding, and the bleeding does not stop after 30 minutes of holding steady pressure on the site.  · The area near or just beyond the catheter insertion site becomes pale, cool, tingly, or numb.     This information is not intended to replace advice given to you by your health care provider. Make sure you discuss any questions you have with your health care provider.     Document Released: 07/06/2006 Document Revised: 01/08/2016 Document Reviewed: 07/06/2015  [employer] Interactive Patient Education ©2016 [employer]

## 2019-04-29 NOTE — PROGRESS NOTES
Cardiology Follow Up Progress Note    Date of Service  4/29/2019    Attending Physician  Parag Garcia M.D.    Chief Complaint   Chest pain    HPI  Leela Carmona is a 48 y.o. female admitted 4/27/2019 with chest pain.  Underwent cardiac catheterization which showed no obstructive coronary artery disease most likely stress cardiomyopathy.        Past medical history tobacco abuse and hypertension    Interim Events  4/29/19: patient anxious and wanting to go home. Denies any chest pain or SOB. No rhythm issue overnight or this morning. Blood pressure stable.     Review of Systems  Review of Systems   Constitutional: Negative for chills, diaphoresis and fever.   HENT: Negative for nosebleeds and trouble swallowing.    Respiratory: Negative for cough, chest tightness and shortness of breath.    Cardiovascular: Negative for chest pain, palpitations and leg swelling.   Gastrointestinal: Negative for abdominal distention, abdominal pain and blood in stool.   Genitourinary: Negative for hematuria.   Skin: Negative for color change.   Neurological: Negative for dizziness, syncope and numbness.   Psychiatric/Behavioral: Negative for agitation and confusion. The patient is not nervous/anxious.        Vital signs in last 24 hours  Temp:  [35.9 °C (96.6 °F)-37 °C (98.6 °F)] 36.6 °C (97.8 °F)  Pulse:  [60-79] 72  Resp:  [16-17] 16  BP: ()/(51-90) 101/63  SpO2:  [94 %-98 %] 95 %    Physical Exam  Physical Exam   Constitutional: She is oriented to person, place, and time. She appears well-developed and well-nourished. No distress.   HENT:   Head: Normocephalic.   Neck: Normal range of motion. No JVD present.   Cardiovascular: Normal rate, regular rhythm, normal heart sounds and intact distal pulses.    No murmur heard.  Pulmonary/Chest: Effort normal and breath sounds normal. No respiratory distress. She has no wheezes.   Abdominal: She exhibits no distension. There is no tenderness.   Musculoskeletal: She exhibits  no edema or tenderness.   Neurological: She is alert and oriented to person, place, and time.   Skin: Skin is warm and dry. No rash noted. She is not diaphoretic.   Psychiatric: Her behavior is normal. Judgment normal.   Nursing note and vitals reviewed.      Lab Review  Lab Results   Component Value Date/Time    WBC 10.4 04/28/2019 12:25 AM    RBC 4.59 04/28/2019 12:25 AM    HEMOGLOBIN 13.5 04/28/2019 12:25 AM    HEMATOCRIT 40.4 04/28/2019 12:25 AM    MCV 88.0 04/28/2019 12:25 AM    MCH 29.4 04/28/2019 12:25 AM    MCHC 33.4 (L) 04/28/2019 12:25 AM    MPV 9.0 04/28/2019 12:25 AM      Lab Results   Component Value Date/Time    SODIUM 140 04/29/2019 02:09 AM    POTASSIUM 4.6 04/29/2019 02:09 AM    CHLORIDE 110 04/29/2019 02:09 AM    CO2 25 04/29/2019 02:09 AM    GLUCOSE 89 04/29/2019 02:09 AM    BUN 18 04/29/2019 02:09 AM    CREATININE 0.91 04/29/2019 02:09 AM      Lab Results   Component Value Date/Time    ASTSGOT 15 04/27/2019 12:21 PM    ALTSGPT 13 04/27/2019 12:21 PM     Lab Results   Component Value Date/Time    CHOLSTRLTOT 152 04/28/2019 12:25 AM    LDL 64 04/28/2019 12:25 AM    HDL 32 (A) 04/28/2019 12:25 AM    TRIGLYCERIDE 280 (H) 04/28/2019 12:25 AM    TROPONINI 1.33 (H) 04/28/2019 06:05 AM             Cardiac Imaging and Procedures Review  EKG:    SINUS RHYTHM   ABNRM R PROG, CONSIDER ASMI OR LEAD PLACEMENT   Compared to ECG 04/27/2019 18:44:03   NO SIGNIFICANT CHANGE   Electronically Signed On 4- 3:07:36 PDT by Prasanth Negrete MD     Echocardiogram:    4/28/19  No prior study is available for comparison.   Low normal left ventricular systolic function.  Left ventricular ejection fraction is visually estimated to be 50%.  Hypokinetic LV apex.  Contrast study, no apical thrombus.  Mild aortic insufficiency.  Unable to estimate pulmonary artery pressure due to an inadequate   tricuspid regurgitant jet.  Normal inferior vena cava size and inspiratory collapse.    Cardiac Catheterization:    4/28/19  1.   Left main coronary artery:  Normal.  2.  Left anterior descending artery:  Luminal irregularities.   3.  Left circumflex coronary artery:  Normal. Gives two marginal branches, which have no significant disease   4.  Right coronary artery:  Normal.  This is a right dominant system.  5.  Left ventricular end diastolic pressure:   21mmHg.  No signficant gradient across the aortic valve.  6.  Left ventriculogram:  Ejection fraction of 40%. Stress cardiomyopathy.     Once all the views were obtained, all wires and catheters were removed from the patient without difficulty.  A Vasc-Band was placed over the right radial artery and the radial artery sheath was removed without difficulty.       Complications:  There was no evidence of hematoma or other complications.  The patient was transferred to the recovery area in stable condition.      Impression:  Stress cardiomyopathy.     Recommendations:  Guideline directed medical therapy   Cardiovascular Risk factor modification    Assessment/Plan  No new Assessment & Plan notes have been filed under this hospital service since the last note was generated.  Service: Cardiology    1. Stress cardiomyopathy:  - ECHO: ef 50% with hypokinetic in the LV apex.  Cardiac catheterization showed nonobstructive coronary artery disease.  - trop peaked at 3.54  -Continue aspirin 81 mg, atorvastatin 80 mg daily, lisinopril 5 mg daily, and Toprol-XL 25 mg daily    2. Hypertension:  - stable     3. Tobacco abuse:  - smoking cessation discussed     Future Appointments  Date Time Provider Department Center   5/21/2019 3:20 PM Colton MAHONEY None         Thank you for allowing me to participate in the care of this patient.  Cardiology will sign off on this patient    Please contact me with any questions.    JESUS Hardy   [facility]

## 2019-05-13 ENCOUNTER — TELEPHONE (OUTPATIENT)
Dept: CARDIOLOGY | Facility: MEDICAL CENTER | Age: 49
End: 2019-05-13

## 2019-05-22 ENCOUNTER — OFFICE VISIT (OUTPATIENT)
Dept: CARDIOLOGY | Facility: MEDICAL CENTER | Age: 49
End: 2019-05-22
Payer: MEDICAID

## 2019-05-22 VITALS
BODY MASS INDEX: 36.8 KG/M2 | OXYGEN SATURATION: 97 % | SYSTOLIC BLOOD PRESSURE: 100 MMHG | HEART RATE: 60 BPM | HEIGHT: 66 IN | WEIGHT: 229 LBS | DIASTOLIC BLOOD PRESSURE: 48 MMHG

## 2019-05-22 DIAGNOSIS — I10 ESSENTIAL HYPERTENSION: ICD-10-CM

## 2019-05-22 DIAGNOSIS — Z72.0 TOBACCO USE: ICD-10-CM

## 2019-05-22 DIAGNOSIS — I51.81 STRESS-INDUCED CARDIOMYOPATHY: ICD-10-CM

## 2019-05-22 PROCEDURE — 99214 OFFICE O/P EST MOD 30 MIN: CPT | Performed by: NURSE PRACTITIONER

## 2019-05-22 RX ORDER — METOPROLOL SUCCINATE 25 MG/1
25 TABLET, EXTENDED RELEASE ORAL DAILY
Qty: 30 TAB | Refills: 11 | Status: SHIPPED | OUTPATIENT
Start: 2019-05-22 | End: 2019-10-18 | Stop reason: SDUPTHER

## 2019-05-22 RX ORDER — LISINOPRIL 5 MG/1
5 TABLET ORAL DAILY
Qty: 30 TAB | Refills: 11 | Status: SHIPPED | OUTPATIENT
Start: 2019-05-22 | End: 2019-10-18 | Stop reason: SDUPTHER

## 2019-05-22 RX ORDER — ATORVASTATIN CALCIUM 80 MG/1
40 TABLET, FILM COATED ORAL EVERY EVENING
Qty: 30 TAB | Refills: 5 | Status: SHIPPED | OUTPATIENT
Start: 2019-05-22 | End: 2020-09-11 | Stop reason: SDUPTHER

## 2019-05-22 ASSESSMENT — ENCOUNTER SYMPTOMS
DIZZINESS: 0
PALPITATIONS: 0
ORTHOPNEA: 0
WEAKNESS: 0
FALLS: 0
LOSS OF CONSCIOUSNESS: 0
DOUBLE VISION: 0
WHEEZING: 0
BLURRED VISION: 0
SHORTNESS OF BREATH: 0
FOCAL WEAKNESS: 0
COUGH: 0
HEADACHES: 0

## 2019-05-22 NOTE — PROGRESS NOTES
Chief Complaint   Patient presents with   • Chest Pain     Follow up       Subjective:   Leela Carmona is a 48 y.o. female who presents today for hospital follow up after stress cardiomyopathy.    She was seen by Dr. Shahid in the hospital, history of hypertension, tobacco abuse and thyroid disorder.    Hospitalized on 4/29/19 for chest pain and NSTEMI. She underwent cardiac catheterization which showed no obstructive coronary artery disease most likely stress cardiomyopathy.      Patient is feeling great.  No recurrent chest pain.  She has had no episodes of palpitations, dizziness/lightheadedness, shortness of breath, orthopnea, or peripheral edema.    She is trying to quit smoking.    Past Medical History:   Diagnosis Date   • Hypertension, essential    • NSTEMI (non-ST elevated myocardial infarction) ([facility]) 04/27/2019   • Pre-diabetes    • Psychiatric disorder    • Smoking    • Thyroid disorder      Past Surgical History:   Procedure Laterality Date   • PRIMARY C SECTION  4/4/2012    Performed by JAROCHO DOYLE at [facility]     Family History   Problem Relation Age of Onset   • Heart Attack Maternal Grandfather    • Other Mother         cardiac arrest, ? PEA     Social History     Social History   • Marital status: Single     Spouse name: N/A   • Number of children: N/A   • Years of education: N/A     Occupational History   • Not on file.     Social History Main Topics   • Smoking status: Current Every Day Smoker     Packs/day: 0.75     Years: 33.00     Types: Cigarettes   • Smokeless tobacco: Never Used   • Alcohol use Yes      Comment: rare   • Drug use: No   • Sexual activity: Yes     Partners: Male     Other Topics Concern   • Not on file     Social History Narrative    Works as  at [employer].      No Known Allergies  Outpatient Encounter Prescriptions as of 5/22/2019   Medication Sig Dispense Refill   • atorvastatin (LIPITOR) 80 MG tablet Take 0.5 Tabs by mouth every  "evening. 30 Tab 5   • lisinopril (PRINIVIL) 5 MG Tab Take 1 Tab by mouth every day. 30 Tab 11   • metoprolol SR (TOPROL XL) 25 MG TABLET SR 24 HR Take 1 Tab by mouth every day. 30 Tab 11   • levothyroxine (SYNTHROID) 150 MCG Tab Take 1 Tab by mouth every evening. 90 Tab 0   • aspirin EC 81 MG EC tablet Take 1 Tab by mouth every day. 30 Tab 0   • citalopram (CELEXA) 40 MG TABS Take 40 mg by mouth every day.     • nicotine (NICODERM) 14 MG/24HR PATCH 24 HR Apply 1 Patch to skin as directed every 24 hours. (Patient not taking: Reported on 5/22/2019) 30 Patch 0   • nicotine polacrilex (NICORETTE) 2 MG Gum Take 1 Each by mouth every 1 hour as needed for Smoking Cessation (For nicotine urge). (Patient not taking: Reported on 5/22/2019) 90 Each 0   • [DISCONTINUED] lisinopril (PRINIVIL) 5 MG Tab Take 1 Tab by mouth every day. 30 Tab 3   • [DISCONTINUED] metoprolol SR (TOPROL XL) 25 MG TABLET SR 24 HR Take 1 Tab by mouth every day. 30 Tab 3   • [DISCONTINUED] atorvastatin (LIPITOR) 80 MG tablet Take 0.5 Tabs by mouth every evening. 30 Tab 3     No facility-administered encounter medications on file as of 5/22/2019.      Review of Systems   Constitutional: Negative for malaise/fatigue.   Eyes: Negative for blurred vision and double vision.   Respiratory: Negative for cough, shortness of breath and wheezing.    Cardiovascular: Negative for chest pain, palpitations, orthopnea and leg swelling.   Musculoskeletal: Negative for falls.   Neurological: Negative for dizziness, focal weakness, loss of consciousness, weakness and headaches.   All other systems reviewed and are negative.       Objective:   /48 (BP Location: Left arm, Patient Position: Sitting, BP Cuff Size: Adult)   Pulse 60   Ht 1.676 m (5' 6\")   Wt 103.9 kg (229 lb)   LMP 04/26/2019 (Exact Date)   SpO2 97%   BMI 36.96 kg/m²     Physical Exam   Constitutional: She is oriented to person, place, and time. She appears well-developed and well-nourished. No " distress.   HENT:   Head: Normocephalic and atraumatic.   Eyes: Pupils are equal, round, and reactive to light.   Neck: No JVD present.   Cardiovascular: Normal rate and regular rhythm.    Murmur heard.  Pulmonary/Chest: Effort normal and breath sounds normal.   Abdominal: Soft. Bowel sounds are normal. She exhibits no distension.   Musculoskeletal: She exhibits no edema.   Neurological: She is alert and oriented to person, place, and time.   Skin: Skin is warm and dry. She is not diaphoretic.   Psychiatric: She has a normal mood and affect. Her behavior is normal. Judgment and thought content normal.       Echocardiogram:    4/28/19  No prior study is available for comparison.   Low normal left ventricular systolic function.  Left ventricular ejection fraction is visually estimated to be 50%.  Hypokinetic LV apex.  Contrast study, no apical thrombus.  Mild aortic insufficiency.  Unable to estimate pulmonary artery pressure due to an inadequate   tricuspid regurgitant jet.  Normal inferior vena cava size and inspiratory collapse.     Cardiac Catheterization:    4/28/19  1.  Left main coronary artery:  Normal.  2.  Left anterior descending artery:  Luminal irregularities.   3.  Left circumflex coronary artery:  Normal. Gives two marginal branches, which have no significant disease   4.  Right coronary artery:  Normal.  This is a right dominant system.  5.  Left ventricular end diastolic pressure:   21mmHg.  No signficant gradient across the aortic valve.  6.  Left ventriculogram:  Ejection fraction of 40%. Stress cardiomyopathy.     Once all the views were obtained, all wires and catheters were removed from the patient without difficulty.  A Vasc-Band was placed over the right radial artery and the radial artery sheath was removed without difficulty.       Complications:  There was no evidence of hematoma or other complications.  The patient was transferred to the recovery area in stable  condition.      Impression:  Stress cardiomyopathy.     Recommendations:  Guideline directed medical therapy   Cardiovascular Risk factor modification    Assessment:     1. Stress-induced cardiomyopathy  atorvastatin (LIPITOR) 80 MG tablet    lisinopril (PRINIVIL) 5 MG Tab    metoprolol SR (TOPROL XL) 25 MG TABLET SR 24 HR   2. Essential hypertension     3. Tobacco use         Medical Decision Making:  Today's Assessment / Status / Plan:     1. Stress cardiomyopathy:  - ECHO: ef 50% with hypokinetic in the LV apex.  Cardiac catheterization showed nonobstructive coronary artery disease.  -Continue aspirin 81 mg, atorvastatin 40 mg daily, lisinopril 5 mg daily, and Toprol-XL 25 mg daily     2. Hypertension:  - stable      3. Tobacco abuse:  - smoking cessation discussed   - she is ready and has nicotine patch available at the pharmacy     Follow up in 6 months with Dr. Shahid, sooner as needed.     Collaborating Provider: Dr. Negrete

## 2019-05-22 NOTE — DOCUMENTATION QUERY
[facility]                                                                       Query Response Note      PATIENT:               FRANCO LOMAS  ACCT #:                  [id number]  MRN:                     [id number]  :                      1970  ADMIT DATE:       2019 2:11 PM  DISCH DATE:        2019 11:16 AM  RESPONDING  PROVIDER #:        [id number]           QUERY TEXT:     Cardiac cath results indicate stress-related cardiomyopathy but Discharge Summary lists NSTEMI as the principal problem.  Please clarify status of this condition:    NOTE:  If an appropriate response is not listed below, please respond with a new note.       The patient's Clinical Indicators include:  Patient admitted with possible NSTEMI, troponin peaked at 3 at OSH.  At [facility] underwent cardiac cath with findings of stress-related cardiomyopathy.  Both diagnoses are listed on the Discharge Summary.    Query created by: Masha Machado on 2019 6:18 AM    RESPONSE TEXT:    NSTEMI is ruled in          Electronically signed by:  DIANA KNIGHT MD 2019 10:13 AM

## 2019-05-22 NOTE — ADDENDUM NOTE
Encounter addended by: Parag Garcia M.D. on: 5/22/2019 10:15 AM<BR>    Actions taken: Sign clinical note

## 2019-05-31 NOTE — ADDENDUM NOTE
Encounter addended by: Filomena Ordonez R.N. on: 5/31/2019  3:20 PM<BR>    Actions taken: Flowsheet accepted

## 2019-10-18 ENCOUNTER — OFFICE VISIT (OUTPATIENT)
Dept: CARDIOLOGY | Facility: MEDICAL CENTER | Age: 49
End: 2019-10-18
Payer: MEDICAID

## 2019-10-18 VITALS
DIASTOLIC BLOOD PRESSURE: 118 MMHG | HEIGHT: 66 IN | BODY MASS INDEX: 37.93 KG/M2 | HEART RATE: 62 BPM | SYSTOLIC BLOOD PRESSURE: 162 MMHG | WEIGHT: 236 LBS | OXYGEN SATURATION: 97 %

## 2019-10-18 DIAGNOSIS — I51.81 STRESS-INDUCED CARDIOMYOPATHY: ICD-10-CM

## 2019-10-18 DIAGNOSIS — I10 ESSENTIAL HYPERTENSION, BENIGN: ICD-10-CM

## 2019-10-18 PROBLEM — F32.A DEPRESSION: Status: ACTIVE | Noted: 2019-10-18

## 2019-10-18 PROBLEM — F41.9 ANXIETY DISORDER: Status: ACTIVE | Noted: 2019-10-18

## 2019-10-18 PROBLEM — L30.9 ECZEMA: Status: ACTIVE | Noted: 2019-03-28

## 2019-10-18 PROBLEM — E66.9 OBESITY WITH BODY MASS INDEX 30 OR GREATER: Status: ACTIVE | Noted: 2017-04-26

## 2019-10-18 PROBLEM — R01.1 SYSTOLIC MURMUR: Status: ACTIVE | Noted: 2019-03-28

## 2019-10-18 PROCEDURE — 99214 OFFICE O/P EST MOD 30 MIN: CPT | Performed by: NURSE PRACTITIONER

## 2019-10-18 RX ORDER — AMOXICILLIN 500 MG/1
CAPSULE ORAL
Refills: 0 | COMMUNITY
Start: 2019-09-18 | End: 2019-10-18

## 2019-10-18 RX ORDER — LISINOPRIL 10 MG/1
10 TABLET ORAL DAILY
Qty: 90 TAB | Refills: 3 | Status: SHIPPED | OUTPATIENT
Start: 2019-10-18 | End: 2019-10-28

## 2019-10-18 RX ORDER — CLOBETASOL PROPIONATE 0.5 MG/G
CREAM TOPICAL
COMMUNITY
Start: 2019-03-28

## 2019-10-18 RX ORDER — IBUPROFEN 800 MG/1
800 TABLET ORAL EVERY 6 HOURS PRN
Refills: 0 | COMMUNITY
Start: 2019-09-18

## 2019-10-18 RX ORDER — METOPROLOL SUCCINATE 25 MG/1
25 TABLET, EXTENDED RELEASE ORAL DAILY
Qty: 90 TAB | Refills: 3 | Status: SHIPPED | OUTPATIENT
Start: 2019-10-18

## 2019-10-18 RX ORDER — LISINOPRIL 5 MG/1
10 TABLET ORAL DAILY
Qty: 30 TAB | Refills: 11 | Status: SHIPPED | OUTPATIENT
Start: 2019-10-18 | End: 2019-10-18

## 2019-10-18 ASSESSMENT — ENCOUNTER SYMPTOMS
PALPITATIONS: 0
LOSS OF CONSCIOUSNESS: 0
WHEEZING: 0
WEAKNESS: 0
HEADACHES: 1
NERVOUS/ANXIOUS: 0
ORTHOPNEA: 0
FOCAL WEAKNESS: 0
FALLS: 0
DIZZINESS: 0
DOUBLE VISION: 0
BLURRED VISION: 0
COUGH: 0
SHORTNESS OF BREATH: 0

## 2019-10-18 NOTE — PROGRESS NOTES
Chief Complaint   Patient presents with   • Hypertension   • Chest Pain       Subjective:   Leela Carmona is a 48 y.o. female who presents today for follow up stress cardiomyopathy.    She was seen by Dr. Shahid in the hospital, history of hypertension, tobacco abuse and thyroid disorder.    Hospitalized on 4/29/19 for chest pain and NSTEMI. She underwent cardiac catheterization which showed no obstructive coronary artery disease most likely stress cardiomyopathy.      Patient has been having headaches and noticed her blood pressure has been elevation. She had her blood pressure check at her dentist and it was elevated per patient. She continues to take the same medications. She stopped working out and has been busy with her work.    She is planning to visit [place] for 9 months.     Past Medical History:   Diagnosis Date   • Hypertension, essential    • NSTEMI (non-ST elevated myocardial infarction) ([facility]) 04/27/2019   • Pre-diabetes    • Psychiatric disorder    • Smoking    • Thyroid disorder      Past Surgical History:   Procedure Laterality Date   • PRIMARY C SECTION  4/4/2012    Performed by JAROCHO DOYLE at [facility]     Family History   Problem Relation Age of Onset   • Heart Attack Maternal Grandfather    • Other Mother         cardiac arrest, ? PEA     Social History     Socioeconomic History   • Marital status: Single     Spouse name: Not on file   • Number of children: Not on file   • Years of education: Not on file   • Highest education level: Not on file   Occupational History   • Not on file   Social Needs   • Financial resource strain: Not on file   • Food insecurity:     Worry: Not on file     Inability: Not on file   • Transportation needs:     Medical: Not on file     Non-medical: Not on file   Tobacco Use   • Smoking status: Current Every Day Smoker     Packs/day: 0.75     Years: 33.00     Pack years: 24.75     Types: Cigarettes   • Smokeless tobacco: Never Used   •  Tobacco comment: one pack per week; pt. does vape   Substance and Sexual Activity   • Alcohol use: Yes     Comment: rare   • Drug use: No   • Sexual activity: Yes     Partners: Male   Lifestyle   • Physical activity:     Days per week: Not on file     Minutes per session: Not on file   • Stress: Not on file   Relationships   • Social connections:     Talks on phone: Not on file     Gets together: Not on file     Attends [employer] service: Not on file     Active member of club or organization: Not on file     Attends meetings of clubs or organizations: Not on file     Relationship status: Not on file   • Intimate partner violence:     Fear of current or ex partner: Not on file     Emotionally abused: Not on file     Physically abused: Not on file     Forced sexual activity: Not on file   Other Topics Concern   • Not on file   Social History Narrative    Works as  at [employer].      No Known Allergies  Outpatient Encounter Medications as of 10/18/2019   Medication Sig Dispense Refill   • clobetasol (TEMOVATE) 0.05 % Cream CLOBETASOL PROPIONATE 0.05 % CREA     • ibuprofen (MOTRIN) 800 MG Tab Take 800 mg by mouth every 6 hours as needed. 6-8 HOURS FOR PAIN  0   • lisinopril (PRINIVIL) 10 MG Tab Take 1 Tab by mouth every day. 90 Tab 3   • metoprolol SR (TOPROL XL) 25 MG TABLET SR 24 HR Take 1 Tab by mouth every day. 90 Tab 3   • atorvastatin (LIPITOR) 80 MG tablet Take 0.5 Tabs by mouth every evening. 30 Tab 5   • levothyroxine (SYNTHROID) 150 MCG Tab Take 1 Tab by mouth every evening. 90 Tab 0   • aspirin EC 81 MG EC tablet Take 1 Tab by mouth every day. 30 Tab 0   • citalopram (CELEXA) 40 MG TABS Take 40 mg by mouth every day.     • [DISCONTINUED] amoxicillin (AMOXIL) 500 MG Cap TAKE 1 CAPSULE BY MOUTH THREE TIMES DAILY UNTIL GONE  0   • [DISCONTINUED] lisinopril (PRINIVIL) 5 MG Tab Take 2 Tabs by mouth every day. 30 Tab 11   • [DISCONTINUED] lisinopril (PRINIVIL) 5 MG Tab Take 1 Tab by mouth every day. 30  "Tab 11   • [DISCONTINUED] metoprolol SR (TOPROL XL) 25 MG TABLET SR 24 HR Take 1 Tab by mouth every day. 30 Tab 11   • [DISCONTINUED] nicotine (NICODERM) 14 MG/24HR PATCH 24 HR Apply 1 Patch to skin as directed every 24 hours. (Patient not taking: Reported on 5/22/2019) 30 Patch 0   • [DISCONTINUED] nicotine polacrilex (NICORETTE) 2 MG Gum Take 1 Each by mouth every 1 hour as needed for Smoking Cessation (For nicotine urge). (Patient not taking: Reported on 5/22/2019) 90 Each 0     No facility-administered encounter medications on file as of 10/18/2019.      Review of Systems   Constitutional: Negative for malaise/fatigue.   Eyes: Negative for blurred vision and double vision.   Respiratory: Negative for cough, shortness of breath and wheezing.    Cardiovascular: Negative for chest pain, palpitations, orthopnea and leg swelling.   Musculoskeletal: Negative for falls.   Neurological: Positive for headaches. Negative for dizziness, focal weakness, loss of consciousness and weakness.   Psychiatric/Behavioral: The patient is not nervous/anxious.    All other systems reviewed and are negative.       Objective:   BP (!) 162/118 (BP Location: Left arm, Patient Position: Sitting, BP Cuff Size: Adult)   Pulse 62   Ht 1.676 m (5' 6\")   Wt 107 kg (236 lb)   SpO2 97%   BMI 38.09 kg/m²     Physical Exam   Constitutional: She is oriented to person, place, and time. She appears well-developed and well-nourished. No distress.   HENT:   Head: Normocephalic and atraumatic.   Eyes: Pupils are equal, round, and reactive to light.   Neck: No JVD present.   Cardiovascular: Normal rate and regular rhythm.   Murmur heard.  Pulmonary/Chest: Effort normal and breath sounds normal.   Abdominal: Soft. Bowel sounds are normal. She exhibits no distension.   Musculoskeletal: She exhibits no edema.   Neurological: She is alert and oriented to person, place, and time.   Skin: Skin is warm and dry. She is not diaphoretic.   Psychiatric: She has " a normal mood and affect. Her behavior is normal. Judgment and thought content normal.       Echocardiogram:    4/28/19  No prior study is available for comparison.   Low normal left ventricular systolic function.  Left ventricular ejection fraction is visually estimated to be 50%.  Hypokinetic LV apex.  Contrast study, no apical thrombus.  Mild aortic insufficiency.  Unable to estimate pulmonary artery pressure due to an inadequate   tricuspid regurgitant jet.  Normal inferior vena cava size and inspiratory collapse.     Cardiac Catheterization:    4/28/19  1.  Left main coronary artery:  Normal.  2.  Left anterior descending artery:  Luminal irregularities.   3.  Left circumflex coronary artery:  Normal. Gives two marginal branches, which have no significant disease   4.  Right coronary artery:  Normal.  This is a right dominant system.  5.  Left ventricular end diastolic pressure:   21mmHg.  No signficant gradient across the aortic valve.  6.  Left ventriculogram:  Ejection fraction of 40%. Stress cardiomyopathy.     Once all the views were obtained, all wires and catheters were removed from the patient without difficulty.  A Vasc-Band was placed over the right radial artery and the radial artery sheath was removed without difficulty.       Complications:  There was no evidence of hematoma or other complications.  The patient was transferred to the recovery area in stable condition.      Impression:  Stress cardiomyopathy.     Recommendations:  Guideline directed medical therapy   Cardiovascular Risk factor modification    Assessment:     1. Stress-induced cardiomyopathy  lisinopril (PRINIVIL) 10 MG Tab    metoprolol SR (TOPROL XL) 25 MG TABLET SR 24 HR    DISCONTINUED: lisinopril (PRINIVIL) 5 MG Tab   2. Essential hypertension, benign         Medical Decision Making:  Today's Assessment / Status / Plan:     1. Stress cardiomyopathy:  - ECHO: ef 50% with hypokinetic in the LV apex.  Cardiac catheterization showed  nonobstructive coronary artery disease.  -Continue aspirin 81 mg, atorvastatin 40 mg daily, and Toprol-XL 25 mg daily     2. Hypertension:  - elevated   - increase lisinopril 10mg qd, she will go home and taken another dose of lisinopril today. She will report back on [facility] regarding blood pressure log.      3. Tobacco abuse:  - smoking cessation discussed     Follow up after [place], keep in touch regarding blood pressure reading through [facility]. ER precaution discussed.     Collaborating Provider: Dr. Aaron

## 2019-10-23 ENCOUNTER — PATIENT MESSAGE (OUTPATIENT)
Dept: CARDIOLOGY | Facility: MEDICAL CENTER | Age: 49
End: 2019-10-23

## 2019-10-28 DIAGNOSIS — I51.81 STRESS-INDUCED CARDIOMYOPATHY: ICD-10-CM

## 2019-10-28 RX ORDER — LISINOPRIL 10 MG/1
TABLET ORAL
Qty: 45 TAB | Refills: 0
Start: 2019-10-28 | End: 2020-09-11 | Stop reason: SDUPTHER

## 2020-03-16 ENCOUNTER — APPOINTMENT (OUTPATIENT)
Dept: CARDIOLOGY | Facility: MEDICAL CENTER | Age: 50
End: 2020-03-16
Payer: MEDICAID

## 2020-09-10 DIAGNOSIS — I51.81 STRESS-INDUCED CARDIOMYOPATHY: ICD-10-CM

## 2020-09-11 DIAGNOSIS — I51.81 STRESS-INDUCED CARDIOMYOPATHY: ICD-10-CM

## 2020-09-11 RX ORDER — LISINOPRIL 10 MG/1
10 TABLET ORAL DAILY
Qty: 90 TAB | Refills: 0 | Status: SHIPPED | OUTPATIENT
Start: 2020-09-11

## 2020-09-11 RX ORDER — ATORVASTATIN CALCIUM 40 MG/1
40 TABLET, FILM COATED ORAL EVERY EVENING
Qty: 90 TAB | Refills: 0 | Status: SHIPPED | OUTPATIENT
Start: 2020-09-11

## 2020-09-11 RX ORDER — LISINOPRIL 10 MG/1
TABLET ORAL
Qty: 15 TAB | Refills: 0 | OUTPATIENT
Start: 2020-09-11

## 2020-09-11 NOTE — TELEPHONE ENCOUNTER
Called pt. Re: Lisinopril 10mg refill. She is taking Lisinopril 10mg daily, as [facility] ordered at October 2019 [facility]. 90-day refill sent to pharmacy. Pt. Is living in [place], currently without insurance. She is waiting for November to enroll in [employer] insurance. She will then schedule appointment with cardiologist where she lives. Pt. Advised that we will work with her re: refills but that she needs cardiology follow-up.

## 2023-05-03 ENCOUNTER — HOSPITAL ENCOUNTER (OUTPATIENT)
Dept: RADIOLOGY | Facility: MEDICAL CENTER | Age: 53
End: 2023-05-03
Payer: MEDICAID

## 2023-05-03 ENCOUNTER — APPOINTMENT (OUTPATIENT)
Dept: RADIOLOGY | Facility: MEDICAL CENTER | Age: 53
End: 2023-05-03
Attending: FAMILY MEDICINE
Payer: MEDICAID

## 2023-05-03 DIAGNOSIS — Z12.31 VISIT FOR SCREENING MAMMOGRAM: ICD-10-CM

## 2024-06-05 PROBLEM — M65.4 DE QUERVAIN'S TENOSYNOVITIS, RIGHT: Status: ACTIVE | Noted: 2024-06-05

## 2024-10-21 NOTE — PROGRESS NOTES
Lab called with critical result of Troponin 2.28 at 1750  . Critical lab result read back to Kimberly GOMEZ.   Dr. Herrera notified of critical lab result at 1830.  Critical lab result read back by Dr. Herrera.    RN paged Dr. Herrera twice, first page was paged to wrong provider. Second page went to Dr. Herrera. Dr Herrera ordered EKG to be completed.    83

## 2025-03-02 ENCOUNTER — APPOINTMENT (OUTPATIENT)
Dept: RADIOLOGY | Facility: IMAGING CENTER | Age: 55
End: 2025-03-02
Attending: PHYSICIAN ASSISTANT
Payer: COMMERCIAL

## 2025-03-02 ENCOUNTER — RESULTS FOLLOW-UP (OUTPATIENT)
Dept: URGENT CARE | Facility: PHYSICIAN GROUP | Age: 55
End: 2025-03-02

## 2025-03-02 ENCOUNTER — OFFICE VISIT (OUTPATIENT)
Dept: URGENT CARE | Facility: PHYSICIAN GROUP | Age: 55
End: 2025-03-02
Payer: COMMERCIAL

## 2025-03-02 VITALS
HEIGHT: 66 IN | SYSTOLIC BLOOD PRESSURE: 136 MMHG | OXYGEN SATURATION: 97 % | TEMPERATURE: 99.7 F | DIASTOLIC BLOOD PRESSURE: 78 MMHG | RESPIRATION RATE: 20 BRPM | HEART RATE: 99 BPM | BODY MASS INDEX: 36.16 KG/M2 | WEIGHT: 225 LBS

## 2025-03-02 DIAGNOSIS — R06.02 SOB (SHORTNESS OF BREATH): ICD-10-CM

## 2025-03-02 DIAGNOSIS — R50.9 FEVER, UNSPECIFIED FEVER CAUSE: ICD-10-CM

## 2025-03-02 DIAGNOSIS — J10.1 INFLUENZA A: ICD-10-CM

## 2025-03-02 LAB
FLUAV RNA SPEC QL NAA+PROBE: POSITIVE
FLUBV RNA SPEC QL NAA+PROBE: NEGATIVE
RSV RNA SPEC QL NAA+PROBE: NEGATIVE
SARS-COV-2 RNA RESP QL NAA+PROBE: NEGATIVE

## 2025-03-02 PROCEDURE — 99204 OFFICE O/P NEW MOD 45 MIN: CPT | Performed by: PHYSICIAN ASSISTANT

## 2025-03-02 PROCEDURE — 3075F SYST BP GE 130 - 139MM HG: CPT | Performed by: PHYSICIAN ASSISTANT

## 2025-03-02 PROCEDURE — 71046 X-RAY EXAM CHEST 2 VIEWS: CPT | Mod: TC,FY | Performed by: RADIOLOGY

## 2025-03-02 PROCEDURE — 3078F DIAST BP <80 MM HG: CPT | Performed by: PHYSICIAN ASSISTANT

## 2025-03-02 PROCEDURE — 0241U POCT CEPHEID COV-2, FLU A/B, RSV - PCR: CPT | Performed by: PHYSICIAN ASSISTANT

## 2025-03-02 RX ORDER — OSELTAMIVIR PHOSPHATE 75 MG/1
75 CAPSULE ORAL 2 TIMES DAILY
Qty: 10 CAPSULE | Refills: 0 | Status: SHIPPED | OUTPATIENT
Start: 2025-03-02

## 2025-03-02 RX ORDER — DEXTROMETHORPHAN HYDROBROMIDE AND PROMETHAZINE HYDROCHLORIDE 15; 6.25 MG/5ML; MG/5ML
5 SYRUP ORAL 3 TIMES DAILY PRN
Qty: 120 ML | Refills: 0 | Status: SHIPPED | OUTPATIENT
Start: 2025-03-02

## 2025-03-02 RX ORDER — ALBUTEROL SULFATE 90 UG/1
2 INHALANT RESPIRATORY (INHALATION) EVERY 6 HOURS PRN
Qty: 8.5 G | Refills: 0 | Status: SHIPPED | OUTPATIENT
Start: 2025-03-02

## 2025-03-02 NOTE — LETTER
March 2, 2025         Patient: Leela Carmona   YOB: 1970   Date of Visit: 3/2/2025           To Whom it May Concern:    Leela Carmona was seen in my clinic on 3/2/2025. Please excuse any absences from work this week due to acute illness.      If you have any questions or concerns, please don't hesitate to call.        Sincerely,           Sadiq Steele P.A.-C.  Electronically Signed

## 2025-03-04 ASSESSMENT — ENCOUNTER SYMPTOMS
HEADACHES: 1
CHILLS: 1
FEVER: 1
SHORTNESS OF BREATH: 0
COUGH: 1
SORE THROAT: 1
WHEEZING: 0
CARDIOVASCULAR NEGATIVE: 1
GASTROINTESTINAL NEGATIVE: 1
MYALGIAS: 1

## 2025-03-04 NOTE — PROGRESS NOTES
Subjective     Leela Carmona is a very pleasant 54 y.o. female who presents with Body Aches (Through entire body, painful to breathe x a few days otc meds aren't helping /Pt sts she had pneumonia last time she felt this way )            HPI  Flu symptoms since yesterday including fever, chills, bodies, headache, fatigue and sweats.  She has cough, congestion and runny nose.  She has tight chest but no chest pain, palpitations, leg swelling, calf tenderness or hemoptysis.  Eating and drinking without vomiting or diarrhea.  History of pneumonia.  OTC meds with limited relief.  No history of DVT/PE.      PMH:  has a past medical history of Hypertension, essential, NSTEMI (non-ST elevated myocardial infarction) (HCC) (04/27/2019), Pre-diabetes, Psychiatric disorder, Smoking, and Thyroid disorder.  MEDS:   Current Outpatient Medications:     albuterol 108 (90 Base) MCG/ACT Aero Soln inhalation aerosol, Inhale 2 Puffs every 6 hours as needed for Shortness of Breath., Disp: 8.5 g, Rfl: 0    oseltamivir (TAMIFLU) 75 MG Cap, Take 1 Capsule by mouth 2 times a day., Disp: 10 Capsule, Rfl: 0    promethazine-dextromethorphan (PROMETHAZINE-DM) 6.25-15 MG/5ML syrup, Take 5 mL by mouth 3 times a day as needed for Cough., Disp: 120 mL, Rfl: 0    XANAX 0.5 MG Tab, , Disp: , Rfl:     QUEtiapine (SEROQUEL) 25 MG Tab, Take 25 mg by mouth at bedtime., Disp: , Rfl:     levothyroxine (SYNTHROID) 137 MCG Tab, Take 137 mcg by mouth every day., Disp: , Rfl:     lisinopril (PRINIVIL) 10 MG Tab, Take 1 Tab by mouth every day., Disp: 90 Tab, Rfl: 0    ibuprofen (MOTRIN) 800 MG Tab, Take 800 mg by mouth every 6 hours as needed. 6-8 HOURS FOR PAIN, Disp: , Rfl: 0    metoprolol SR (TOPROL XL) 25 MG TABLET SR 24 HR, Take 1 Tab by mouth every day., Disp: 90 Tab, Rfl: 3    citalopram (CELEXA) 40 MG TABS, Take 40 mg by mouth every day., Disp: , Rfl:   ALLERGIES:   Allergies   Allergen Reactions    Nickel      Other Reaction(s): Other (See  "Comments)    Bumps and rash on hands, localized swelling,    Onion Rash     Lillian raw onion, bumps on hands    Trace Metals Rash     SURGHX:   Past Surgical History:   Procedure Laterality Date    PRIMARY C SECTION  4/4/2012    Performed by JAROCHO DOYLE at [facility]     SOCHX:  reports that she has been smoking cigarettes. She has a 24.8 pack-year smoking history. She has never used smokeless tobacco. She reports current alcohol use. She reports that she does not use drugs.  FH: family history includes Heart Attack in her maternal grandfather; Other in her mother.      Review of Systems   Constitutional:  Positive for chills, fever and malaise/fatigue.   HENT:  Positive for congestion and sore throat. Negative for ear pain.    Respiratory:  Positive for cough. Negative for shortness of breath and wheezing.    Cardiovascular: Negative.    Gastrointestinal: Negative.    Musculoskeletal:  Positive for myalgias.   Neurological:  Positive for headaches.       Medications, Allergies, and current problem list reviewed today in Epic           Objective     /78   Pulse 99   Temp 37.6 °C (99.7 °F) (Temporal)   Resp 20   Ht 1.676 m (5' 6\")   Wt 102 kg (225 lb)   SpO2 97%   BMI 36.32 kg/m²      Physical Exam  Vitals and nursing note reviewed.   Constitutional:       General: She is not in acute distress.     Appearance: Normal appearance. She is well-developed. She is not ill-appearing, toxic-appearing or diaphoretic.   HENT:      Head: Normocephalic and atraumatic.      Right Ear: Tympanic membrane, ear canal and external ear normal.      Left Ear: Tympanic membrane, ear canal and external ear normal.      Nose: Congestion and rhinorrhea present.      Mouth/Throat:      Mouth: Mucous membranes are moist.      Pharynx: No oropharyngeal exudate or posterior oropharyngeal erythema.   Eyes:      General:         Right eye: No discharge.         Left eye: No discharge.      Conjunctiva/sclera: Conjunctivae " normal.   Cardiovascular:      Rate and Rhythm: Normal rate and regular rhythm.      Pulses: Normal pulses.      Heart sounds: Normal heart sounds.   Pulmonary:      Effort: Pulmonary effort is normal. No respiratory distress.      Breath sounds: Normal breath sounds. No wheezing, rhonchi or rales.   Musculoskeletal:      Cervical back: Normal range of motion and neck supple.      Right lower leg: No edema.      Left lower leg: No edema.   Lymphadenopathy:      Cervical: No cervical adenopathy.   Skin:     General: Skin is warm and dry.   Neurological:      General: No focal deficit present.      Mental Status: She is alert and oriented to person, place, and time. Mental status is at baseline.   Psychiatric:         Mood and Affect: Mood normal.         Behavior: Behavior normal.         Thought Content: Thought content normal.         Judgment: Judgment normal.                             3/2/2025 3:49 PM     HISTORY/REASON FOR EXAM:  Shortness of Breath        TECHNIQUE/EXAM DESCRIPTION AND NUMBER OF VIEWS:  Two views of the chest.     COMPARISON:  None.     FINDINGS:     No pulmonary infiltrates or consolidations are noted.  No pleural effusions, no pneumothorax are appreciated.  Normal cardiopericardial silhouette.        IMPRESSION:        1. No active cardiopulmonary abnormalities are identified.     Assessment & Plan  Fever, unspecified fever cause    Orders:    DX-CHEST-2 VIEWS; Future    POCT CEPHEID COV-2, FLU A/B, RSV - PCR    SOB (shortness of breath)    Orders:    DX-CHEST-2 VIEWS; Future    Influenza A  Flulike symptoms since yesterday.  Fortunately, chest x-ray is negative, pO2 is adequate, and lungs are clear showing no lower respiratory involvement.  She will increase fluids and rest.  OTC meds and conservative measures as discussed.  Strict ER and red flag symptoms reiterated at length.      Orders:    albuterol 108 (90 Base) MCG/ACT Aero Soln inhalation aerosol; Inhale 2 Puffs every 6 hours as  needed for Shortness of Breath.    oseltamivir (TAMIFLU) 75 MG Cap; Take 1 Capsule by mouth 2 times a day.    promethazine-dextromethorphan (PROMETHAZINE-DM) 6.25-15 MG/5ML syrup; Take 5 mL by mouth 3 times a day as needed for Cough.         I personally reviewed prior external notes and test results pertinent to today's visit. Return to clinic or go to ED if symptoms worsen or persist. Red flag symptoms and indications for ED discussed at length. Patient/Parent/Guardian voices understanding.  AVS with post-visit instructions printed and provided or given verbally.  Follow-up with your primary care provider in 3-5 days. All side effects and potential interactions of prescribed medication discussed including allergic response, GI upset, tendon injury, rash, sedation, OCP effectiveness, etc.    Please note that this dictation was created using voice recognition software. I have made every reasonable attempt to correct obvious errors, but I expect that there are errors of grammar and possibly content that I did not discover before finalizing the note.

## 2025-03-28 ENCOUNTER — TELEPHONE (OUTPATIENT)
Dept: HEALTH INFORMATION MANAGEMENT | Facility: OTHER | Age: 55
End: 2025-03-28
Payer: COMMERCIAL

## 2025-06-16 DIAGNOSIS — Z00.6 CLINICAL TRIAL PARTICIPANT: ICD-10-CM

## 2025-06-26 ENCOUNTER — APPOINTMENT (OUTPATIENT)
Dept: RADIOLOGY | Facility: MEDICAL CENTER | Age: 55
End: 2025-06-26
Attending: FAMILY MEDICINE
Payer: COMMERCIAL

## 2025-06-27 ENCOUNTER — HOSPITAL ENCOUNTER (OUTPATIENT)
Dept: LAB | Facility: MEDICAL CENTER | Age: 55
End: 2025-06-27
Attending: FAMILY MEDICINE

## 2025-06-27 DIAGNOSIS — Z00.6 CLINICAL TRIAL PARTICIPANT: ICD-10-CM

## 2025-07-03 ENCOUNTER — RESULTS FOLLOW-UP (OUTPATIENT)
Dept: MEDICAL GROUP | Facility: PHYSICIAN GROUP | Age: 55
End: 2025-07-03
Payer: COMMERCIAL

## 2025-07-03 LAB
ELF SCORE: 9.5 -
HA (HYALURONIC ACID): 68.94 NG/ML
PIIINP (AMINO-TERMINAL PROPEPTIDE): 7.94 NG/ML
RELATIVE RISK: NORMAL
RISK GROUP: NORMAL
RISK: 3.3 %
TIMP-1 (TISSUE INHIBITOR OF MMP1): 189.8 NG/ML

## 2025-07-11 LAB
APOB+LDLR+PCSK9 GENE MUT ANL BLD/T: NOT DETECTED
BRCA1+BRCA2 DEL+DUP + FULL MUT ANL BLD/T: NOT DETECTED
MLH1+MSH2+MSH6+PMS2 GN DEL+DUP+FUL M: NOT DETECTED